# Patient Record
Sex: FEMALE | Race: WHITE | NOT HISPANIC OR LATINO | Employment: FULL TIME | URBAN - METROPOLITAN AREA
[De-identification: names, ages, dates, MRNs, and addresses within clinical notes are randomized per-mention and may not be internally consistent; named-entity substitution may affect disease eponyms.]

---

## 2024-01-24 ENCOUNTER — OFFICE VISIT (OUTPATIENT)
Dept: PODIATRY | Facility: CLINIC | Age: 34
End: 2024-01-24
Payer: COMMERCIAL

## 2024-01-24 ENCOUNTER — ANCILLARY PROCEDURE (OUTPATIENT)
Dept: GENERAL RADIOLOGY | Facility: CLINIC | Age: 34
End: 2024-01-24
Attending: PODIATRIST
Payer: COMMERCIAL

## 2024-01-24 VITALS
DIASTOLIC BLOOD PRESSURE: 87 MMHG | RESPIRATION RATE: 16 BRPM | HEART RATE: 83 BPM | SYSTOLIC BLOOD PRESSURE: 128 MMHG | OXYGEN SATURATION: 100 %

## 2024-01-24 DIAGNOSIS — L02.612 ABSCESS OF FIFTH TOE OF LEFT FOOT: ICD-10-CM

## 2024-01-24 DIAGNOSIS — L03.116 CELLULITIS OF FOOT, LEFT: ICD-10-CM

## 2024-01-24 DIAGNOSIS — L03.116 CELLULITIS OF FOOT, LEFT: Primary | ICD-10-CM

## 2024-01-24 PROCEDURE — 73630 X-RAY EXAM OF FOOT: CPT | Mod: LT | Performed by: RADIOLOGY

## 2024-01-24 PROCEDURE — 87077 CULTURE AEROBIC IDENTIFY: CPT | Performed by: PODIATRIST

## 2024-01-24 PROCEDURE — 87070 CULTURE OTHR SPECIMN AEROBIC: CPT | Performed by: PODIATRIST

## 2024-01-24 PROCEDURE — 99204 OFFICE O/P NEW MOD 45 MIN: CPT | Performed by: PODIATRIST

## 2024-01-24 PROCEDURE — 87205 SMEAR GRAM STAIN: CPT | Performed by: PODIATRIST

## 2024-01-24 PROCEDURE — 87186 SC STD MICRODIL/AGAR DIL: CPT | Performed by: PODIATRIST

## 2024-01-24 RX ORDER — ESCITALOPRAM OXALATE 10 MG/1
5 TABLET ORAL DAILY
COMMUNITY
Start: 2023-05-17

## 2024-01-24 RX ORDER — CEPHALEXIN 500 MG/1
500 CAPSULE ORAL 4 TIMES DAILY
Qty: 40 CAPSULE | Refills: 0 | Status: SHIPPED | OUTPATIENT
Start: 2024-01-24 | End: 2024-03-26

## 2024-01-24 ASSESSMENT — PAIN SCALES - GENERAL: PAINLEVEL: MILD PAIN (3)

## 2024-01-24 NOTE — PROGRESS NOTES
Subjective:    Pt is seen today in consult for pain in her left fifth toe.  This started Sunday night approximately 3 days ago.  Pain aggravated by activity and relieved by rest.  Is slowly getting worse.  It is the end and medial portion of the toe.  She denies any history of trauma.  She denies any significant cold exposure.  Denies drainage.  Denies purulence or odor.  Denies fever or chills.  She is never injured this before.  Denies breaking any objects at home.  She does have a cat.  Has numerous allergies to antibiotics.    ROS:  see above         Allergies   Allergen Reactions    Penicillins Hives and Rash     As a child   As a child   Welts, diarrhea    Welts, diarrhea    As a child    Amoxicillin-Pot Clavulanate     Sulfa Antibiotics Hives    Clarithromycin Rash and Unknown     Abstracted from transferred records - [facility])    Sulfamethoxazole-Trimethoprim Rash     Abstracted from transferred records - [facility])       Current Outpatient Medications   Medication Sig Dispense Refill    cephALEXin (KEFLEX) 500 MG capsule Take 1 capsule (500 mg) by mouth 4 times daily 40 capsule 0    escitalopram (LEXAPRO) 10 MG tablet Take 10 mg by mouth daily      cholecalciferol 50 MCG (2000 UT) CAPS Take by mouth.      Prenatal Vit-Fe Fumarate-FA (M-VIT PO)          There is no problem list on file for this patient.      History reviewed. No pertinent past medical history.    History reviewed. No pertinent surgical history.    History reviewed. No pertinent family history.    Social History     Tobacco Use    Smoking status: Never    Smokeless tobacco: Never   Substance Use Topics    Alcohol use: Yes         Exam:    Vitals: /87   Pulse 83   Resp 16   SpO2 100%   BMI: There is no height or weight on file to calculate BMI.  Height: Data Unavailable    Constitutional/ general:  Pt is in no apparent distress, appears well-nourished.  Cooperative with history and physical exam.     Psych:  The patient answered questions  appropriately.  Normal affect.  Seems to have reasonable expectations, in terms of treatment.     Eyes:  Visual scanning/ tracking without deficit.     Ears:  Response to auditory stimuli is normal.  negative hearing aid devices.  Auricles in proper alignment.     Lymphatic:  Popliteal lymph nodes not enlarged.     Lungs:  Non labored breathing, non labored speech. No cough.  No audible wheezing. Even, quiet breathing.       Vascular:  positive pedal pulses bilaterally for both the DP and PT arteries.  CFT < 3 sec.  negative ankle edema.  positive pedal hair growth.    Neuro:  Alert and oriented x 3. Coordinated gait.  Light touch sensation is intact     Derm: Normal texture and turgor.  No erythema, ecchymosis, or cyanosis.      Musculoskeletal:    Lower extremity muscle strength is normal.  Patient is ambulatory without an assistive device or brace.  No gross deformities.  Normal range of motion of all forefoot joints.  Left fifth toe has erythema edema pain on palpation distal third toe.  With trimming back left fifth nail we note small amount of purulence from distal leading edge of nail centrally.  The nail is intact.  There is possibly another small tract distal toe.  There is no crepitus with palpation.  There is no odor.    Radiographic Exam:  X-Ray Findings:  I personally reviewed the films.  Unremarkable    A:  Left fifth toe cellulitis with underlying small abscess    P: We trim back left fifth nail.  Small amount of purulence was noted.  We cultured this..  She has cellulitis.  Discussed chilblains possibility but with purulence more likely cellulitis.  Will start on Keflex.  We noted her numerous allergies.  Will stop if any side effects from Keflex.  Will keep toe protected.  Discussed postop shoe but she would rather continue wearing sandals.  X-rays taken today of left foot.  I personally reviewed the x-rays.  Will call with culture results in 2 days.  Discussed with patient my out of town for the  next 2 weeks following this.  If erythema and edema get worse she will contact me where she may need to go to ED.    Hermes Bailey DPM, FACFAS

## 2024-01-24 NOTE — LETTER
1/24/2024         RE: Susan Andrea  [address]  [facility] [place] [address]        Dear Colleague,    Thank you for referring your patient, Susan Andrea, to the [facility]. Please see a copy of my visit note below.    Subjective:    Pt is seen today in consult for pain in her left fifth toe.  This started Sunday night approximately 3 days ago.  Pain aggravated by activity and relieved by rest.  Is slowly getting worse.  It is the end and medial portion of the toe.  She denies any history of trauma.  She denies any significant cold exposure.  Denies drainage.  Denies purulence or odor.  Denies fever or chills.  She is never injured this before.  Denies breaking any objects at home.  She does have a cat.  Has numerous allergies to antibiotics.    ROS:  see above         Allergies   Allergen Reactions     Penicillins Hives and Rash     As a child   As a child   Welts, diarrhea    Welts, diarrhea    As a child     Amoxicillin-Pot Clavulanate      Sulfa Antibiotics Hives     Clarithromycin Rash and Unknown     Abstracted from transferred records - [facility])     Sulfamethoxazole-Trimethoprim Rash     Abstracted from transferred records - [facility])       Current Outpatient Medications   Medication Sig Dispense Refill     cephALEXin (KEFLEX) 500 MG capsule Take 1 capsule (500 mg) by mouth 4 times daily 40 capsule 0     escitalopram (LEXAPRO) 10 MG tablet Take 10 mg by mouth daily       cholecalciferol 50 MCG (2000 UT) CAPS Take by mouth.       Prenatal Vit-Fe Fumarate-FA (M-VIT PO)          There is no problem list on file for this patient.      History reviewed. No pertinent past medical history.    History reviewed. No pertinent surgical history.    History reviewed. No pertinent family history.    Social History     Tobacco Use     Smoking status: Never     Smokeless tobacco: Never   Substance Use Topics     Alcohol use: Yes         Exam:    Vitals: /87   Pulse 83   Resp 16    SpO2 100%   BMI: There is no height or weight on file to calculate BMI.  Height: Data Unavailable    Constitutional/ general:  Pt is in no apparent distress, appears well-nourished.  Cooperative with history and physical exam.     Psych:  The patient answered questions appropriately.  Normal affect.  Seems to have reasonable expectations, in terms of treatment.     Eyes:  Visual scanning/ tracking without deficit.     Ears:  Response to auditory stimuli is normal.  negative hearing aid devices.  Auricles in proper alignment.     Lymphatic:  Popliteal lymph nodes not enlarged.     Lungs:  Non labored breathing, non labored speech. No cough.  No audible wheezing. Even, quiet breathing.       Vascular:  positive pedal pulses bilaterally for both the DP and PT arteries.  CFT < 3 sec.  negative ankle edema.  positive pedal hair growth.    Neuro:  Alert and oriented x 3. Coordinated gait.  Light touch sensation is intact     Derm: Normal texture and turgor.  No erythema, ecchymosis, or cyanosis.      Musculoskeletal:    Lower extremity muscle strength is normal.  Patient is ambulatory without an assistive device or brace.  No gross deformities.  Normal range of motion of all forefoot joints.  Left fifth toe has erythema edema pain on palpation distal third toe.  With trimming back left fifth nail we note small amount of purulence from distal leading edge of nail centrally.  The nail is intact.  There is possibly another small tract distal toe.  There is no crepitus with palpation.  There is no odor.    Radiographic Exam:  X-Ray Findings:  I personally reviewed the films.  Unremarkable    A:  Left fifth toe cellulitis with underlying small abscess    P: We trim back left fifth nail.  Small amount of purulence was noted.  We cultured this..  She has cellulitis.  Discussed chilblains possibility but with purulence more likely cellulitis.  Will start on Keflex.  We noted her numerous allergies.  Will stop if any side effects  from Keflex.  Will keep toe protected.  Discussed postop shoe but she would rather continue wearing sandals.  X-rays taken today of left foot.  I personally reviewed the x-rays.  Will call with culture results in 2 days.  Discussed with patient my out of town for the next 2 weeks following this.  If erythema and edema get worse she will contact me where she may need to go to ED.    Hermes Bailey DPM, FACFAS              Again, thank you for allowing me to participate in the care of your patient.        Sincerely,        Hermes Bailey DPM

## 2024-01-29 LAB
BACTERIA SKIN AEROBE CULT: ABNORMAL
GRAM STAIN RESULT: ABNORMAL

## 2024-03-10 ENCOUNTER — HEALTH MAINTENANCE LETTER (OUTPATIENT)
Age: 34
End: 2024-03-10

## 2024-03-25 SDOH — HEALTH STABILITY: PHYSICAL HEALTH: ON AVERAGE, HOW MANY MINUTES DO YOU ENGAGE IN EXERCISE AT THIS LEVEL?: 30 MIN

## 2024-03-25 SDOH — HEALTH STABILITY: PHYSICAL HEALTH: ON AVERAGE, HOW MANY DAYS PER WEEK DO YOU ENGAGE IN MODERATE TO STRENUOUS EXERCISE (LIKE A BRISK WALK)?: 4 DAYS

## 2024-03-25 ASSESSMENT — SOCIAL DETERMINANTS OF HEALTH (SDOH): HOW OFTEN DO YOU GET TOGETHER WITH FRIENDS OR RELATIVES?: ONCE A WEEK

## 2024-03-26 ENCOUNTER — OFFICE VISIT (OUTPATIENT)
Dept: FAMILY MEDICINE | Facility: CLINIC | Age: 34
End: 2024-03-26
Payer: COMMERCIAL

## 2024-03-26 VITALS
OXYGEN SATURATION: 99 % | WEIGHT: 146 LBS | BODY MASS INDEX: 22.91 KG/M2 | HEIGHT: 67 IN | DIASTOLIC BLOOD PRESSURE: 80 MMHG | HEART RATE: 95 BPM | TEMPERATURE: 99.3 F | SYSTOLIC BLOOD PRESSURE: 115 MMHG | RESPIRATION RATE: 16 BRPM

## 2024-03-26 DIAGNOSIS — Z13.0 SCREENING, ANEMIA, DEFICIENCY, IRON: ICD-10-CM

## 2024-03-26 DIAGNOSIS — Z13.29 SCREENING FOR THYROID DISORDER: ICD-10-CM

## 2024-03-26 DIAGNOSIS — L70.0 ACNE VULGARIS: ICD-10-CM

## 2024-03-26 DIAGNOSIS — D17.30 LIPOMA OF SKIN AND SUBCUTANEOUS TISSUE: ICD-10-CM

## 2024-03-26 DIAGNOSIS — Z13.1 SCREENING FOR DIABETES MELLITUS: ICD-10-CM

## 2024-03-26 DIAGNOSIS — Z00.00 ROUTINE GENERAL MEDICAL EXAMINATION AT A HEALTH CARE FACILITY: Primary | ICD-10-CM

## 2024-03-26 PROBLEM — R73.03 PREDIABETES: Status: ACTIVE | Noted: 2023-02-16

## 2024-03-26 PROBLEM — F41.8 DEPRESSION WITH ANXIETY: Status: ACTIVE | Noted: 2023-02-13

## 2024-03-26 LAB
ALBUMIN SERPL BCG-MCNC: 4.6 G/DL (ref 3.5–5.2)
ALP SERPL-CCNC: 54 U/L (ref 40–150)
ALT SERPL W P-5'-P-CCNC: 14 U/L (ref 0–50)
ANION GAP SERPL CALCULATED.3IONS-SCNC: 12 MMOL/L (ref 7–15)
AST SERPL W P-5'-P-CCNC: 16 U/L (ref 0–45)
BASOPHILS # BLD AUTO: 0 10E3/UL (ref 0–0.2)
BASOPHILS NFR BLD AUTO: 0 %
BILIRUB SERPL-MCNC: 0.3 MG/DL
BUN SERPL-MCNC: 12.2 MG/DL (ref 6–20)
CALCIUM SERPL-MCNC: 9.7 MG/DL (ref 8.6–10)
CHLORIDE SERPL-SCNC: 102 MMOL/L (ref 98–107)
CREAT SERPL-MCNC: 0.71 MG/DL (ref 0.51–0.95)
DEPRECATED HCO3 PLAS-SCNC: 24 MMOL/L (ref 22–29)
EGFRCR SERPLBLD CKD-EPI 2021: >90 ML/MIN/1.73M2
EOSINOPHIL # BLD AUTO: 0.1 10E3/UL (ref 0–0.7)
EOSINOPHIL NFR BLD AUTO: 1 %
ERYTHROCYTE [DISTWIDTH] IN BLOOD BY AUTOMATED COUNT: 12.2 % (ref 10–15)
GLUCOSE SERPL-MCNC: 99 MG/DL (ref 70–99)
HBA1C MFR BLD: 5.5 % (ref 0–5.6)
HCT VFR BLD AUTO: 42.8 % (ref 35–47)
HGB BLD-MCNC: 14.4 G/DL (ref 11.7–15.7)
IMM GRANULOCYTES # BLD: 0 10E3/UL
IMM GRANULOCYTES NFR BLD: 0 %
LYMPHOCYTES # BLD AUTO: 1.4 10E3/UL (ref 0.8–5.3)
LYMPHOCYTES NFR BLD AUTO: 29 %
MCH RBC QN AUTO: 30.3 PG (ref 26.5–33)
MCHC RBC AUTO-ENTMCNC: 33.6 G/DL (ref 31.5–36.5)
MCV RBC AUTO: 90 FL (ref 78–100)
MONOCYTES # BLD AUTO: 0.3 10E3/UL (ref 0–1.3)
MONOCYTES NFR BLD AUTO: 7 %
NEUTROPHILS # BLD AUTO: 2.9 10E3/UL (ref 1.6–8.3)
NEUTROPHILS NFR BLD AUTO: 62 %
PLATELET # BLD AUTO: 325 10E3/UL (ref 150–450)
POTASSIUM SERPL-SCNC: 4.6 MMOL/L (ref 3.4–5.3)
PROT SERPL-MCNC: 7.4 G/DL (ref 6.4–8.3)
RBC # BLD AUTO: 4.75 10E6/UL (ref 3.8–5.2)
SODIUM SERPL-SCNC: 138 MMOL/L (ref 135–145)
TSH SERPL DL<=0.005 MIU/L-ACNC: 1.5 UIU/ML (ref 0.3–4.2)
WBC # BLD AUTO: 4.7 10E3/UL (ref 4–11)

## 2024-03-26 PROCEDURE — 80053 COMPREHEN METABOLIC PANEL: CPT | Performed by: PHYSICIAN ASSISTANT

## 2024-03-26 PROCEDURE — 85025 COMPLETE CBC W/AUTO DIFF WBC: CPT | Performed by: PHYSICIAN ASSISTANT

## 2024-03-26 PROCEDURE — 83036 HEMOGLOBIN GLYCOSYLATED A1C: CPT | Performed by: PHYSICIAN ASSISTANT

## 2024-03-26 PROCEDURE — 84443 ASSAY THYROID STIM HORMONE: CPT | Performed by: PHYSICIAN ASSISTANT

## 2024-03-26 PROCEDURE — 36415 COLL VENOUS BLD VENIPUNCTURE: CPT | Performed by: PHYSICIAN ASSISTANT

## 2024-03-26 PROCEDURE — 99385 PREV VISIT NEW AGE 18-39: CPT | Performed by: PHYSICIAN ASSISTANT

## 2024-03-26 RX ORDER — SPIRONOLACTONE 25 MG/1
TABLET ORAL
Qty: 74 TABLET | Refills: 1 | Status: SHIPPED | OUTPATIENT
Start: 2024-03-26 | End: 2024-07-01

## 2024-03-26 RX ORDER — TRETINOIN 0.5 MG/G
CREAM TOPICAL AT BEDTIME
COMMUNITY
Start: 2023-04-03

## 2024-03-26 RX ORDER — DEXTROAMPHETAMINE SACCHARATE, AMPHETAMINE ASPARTATE MONOHYDRATE, DEXTROAMPHETAMINE SULFATE AND AMPHETAMINE SULFATE 3.75; 3.75; 3.75; 3.75 MG/1; MG/1; MG/1; MG/1
15 CAPSULE, EXTENDED RELEASE ORAL DAILY
COMMUNITY
Start: 2024-03-04

## 2024-03-26 RX ORDER — BENZOYL PEROXIDE 10 G/100G
SUSPENSION TOPICAL WEEKLY
COMMUNITY

## 2024-03-26 RX ORDER — COPPER 313.4 MG/1
1 INTRAUTERINE DEVICE INTRAUTERINE ONCE
COMMUNITY

## 2024-03-26 RX ORDER — ASCORBIC ACID 125 MG
250 TABLET,CHEWABLE ORAL DAILY
COMMUNITY

## 2024-03-26 ASSESSMENT — PAIN SCALES - GENERAL: PAINLEVEL: NO PAIN (0)

## 2024-03-26 NOTE — PATIENT INSTRUCTIONS
Preventive Care Advice   This is general advice given by our system to help you stay healthy. However, your care team may have specific advice just for you. Please talk to your care team about your preventive care needs.  Nutrition  Eat 5 or more servings of fruits and vegetables each day.  Try wheat bread, brown rice and whole grain pasta (instead of white bread, rice, and pasta).  Get enough calcium and vitamin D. Check the label on foods and aim for 100% of the RDA (recommended daily allowance).  Lifestyle  Exercise at least 150 minutes each week   (30 minutes a day, 5 days a week).  Do muscle strengthening activities 2 days a week. These help control your weight and prevent disease.  No smoking.  Wear sunscreen to prevent skin cancer.  Have a dental exam and cleaning every 6 months.  Yearly exams  See your health care team every year to talk about:  Any changes in your health.  Any medicines your care team has prescribed.  Preventive care, family planning, and ways to prevent chronic diseases.  Shots (vaccines)   HPV shots (up to age 26), if you've never had them before.  Hepatitis B shots (up to age 59), if you've never had them before.  COVID-19 shot: Get this shot when it's due.  Flu shot: Get a flu shot every year.  Tetanus shot: Get a tetanus shot every 10 years.  Pneumococcal, hepatitis A, and RSV shots: Ask your care team if you need these based on your risk.  Shingles shot (for age 50 and up).  General health tests  Diabetes screening:  Starting at age 35, Get screened for diabetes at least every 3 years.  If you are younger than age 35, ask your care team if you should be screened for diabetes.  Cholesterol test: At age 39, start having a cholesterol test every 5 years, or more often if advised.  Bone density scan (DEXA): At age 50, ask your care team if you should have this scan for osteoporosis (brittle bones).  Hepatitis C: Get tested at least once in your life.  STIs (sexually transmitted  infections)  Before age 24: Ask your care team if you should be screened for STIs.  After age 24: Get screened for STIs if you're at risk. You are at risk for STIs (including HIV) if:  You are sexually active with more than one person.  You don't use condoms every time.  You or a partner was diagnosed with a sexually transmitted infection.  If you are at risk for HIV, ask about PrEP medicine to prevent HIV.  Get tested for HIV at least once in your life, whether you are at risk for HIV or not.  Cancer screening tests  Cervical cancer screening: If you have a cervix, begin getting regular cervical cancer screening tests at age 21. Most people who have regular screenings with normal results can stop after age 65. Talk about this with your provider.  Breast cancer scan (mammogram): If you've ever had breasts, begin having regular mammograms starting at age 40. This is a scan to check for breast cancer.  Colon cancer screening: It is important to start screening for colon cancer at age 45.  Have a colonoscopy test every 10 years (or more often if you're at risk) Or, ask your provider about stool tests like a FIT test every year or Cologuard test every 3 years.  To learn more about your testing options, visit: https://www.EchoSign/270193.pdf.  For help making a decision, visit: https://bit.ly/bz32887.  Prostate cancer screening test: If you have a prostate and are age 55 to 69, ask your provider if you would benefit from a yearly prostate cancer screening test.  Lung cancer screening: If you are a current or former smoker age 50 to 80, ask your care team if ongoing lung cancer screenings are right for you.  For informational purposes only. Not to replace the advice of your health care provider. Copyright   2023 [employer]. All rights reserved. Clinically reviewed by the [facility] Transitions Program. [employer] [id number] - REV 01/24.    Learning About Stress  What is stress?     Stress is your  body's response to a hard situation. Your body can have a physical, emotional, or mental response. Stress is a fact of life for most people, and it affects everyone differently. What causes stress for you may not be stressful for someone else.  A lot of things can cause stress. You may feel stress when you go on a job interview, take a test, or run a race. This kind of short-term stress is normal and even useful. It can help you if you need to work hard or react quickly. For example, stress can help you finish an important job on time.  Long-term stress is caused by ongoing stressful situations or events. Examples of long-term stress include long-term health problems, ongoing problems at work, or conflicts in your family. Long-term stress can harm your health.  How does stress affect your health?  When you are stressed, your body responds as though you are in danger. It makes hormones that speed up your heart, make you breathe faster, and give you a burst of energy. This is called the fight-or-flight stress response. If the stress is over quickly, your body goes back to normal and no harm is done.  But if stress happens too often or lasts too long, it can have bad effects. Long-term stress can make you more likely to get sick, and it can make symptoms of some diseases worse. If you tense up when you are stressed, you may develop neck, shoulder, or low back pain. Stress is linked to high blood pressure and heart disease.  Stress also harms your emotional health. It can make you carrero, tense, or depressed. Your relationships may suffer, and you may not do well at work or school.  What can you do to manage stress?  You can try these things to help manage stress:   Do something active. Exercise or activity can help reduce stress. Walking is a great way to get started. Even everyday activities such as housecleaning or yard work can help.  Try yoga or елена chi. These techniques combine exercise and meditation. You may need  some training at first to learn them.  Do something you enjoy. For example, listen to music or go to a movie. Practice your hobby or do volunteer work.  Meditate. This can help you relax, because you are not worrying about what happened before or what may happen in the future.  Do guided imagery. Imagine yourself in any setting that helps you feel calm. You can use online videos, books, or a teacher to guide you.  Do breathing exercises. For example:  From a standing position, bend forward from the waist with your knees slightly bent. Let your arms dangle close to the floor.  Breathe in slowly and deeply as you return to a standing position. Roll up slowly and lift your head last.  Hold your breath for just a few seconds in the standing position.  Breathe out slowly and bend forward from the waist.  Let your feelings out. Talk, laugh, cry, and express anger when you need to. Talking with supportive friends or family, a counselor, or a sin leader about your feelings is a healthy way to relieve stress. Avoid discussing your feelings with people who make you feel worse.  Write. It may help to write about things that are bothering you. This helps you find out how much stress you feel and what is causing it. When you know this, you can find better ways to cope.  What can you do to prevent stress?  You might try some of these things to help prevent stress:  Manage your time. This helps you find time to do the things you want and need to do.  Get enough sleep. Your body recovers from the stresses of the day while you are sleeping.  Get support. Your family, friends, and community can make a difference in how you experience stress.  Limit your news feed. Avoid or limit time on social media or news that may make you feel stressed.  Do something active. Exercise or activity can help reduce stress. Walking is a great way to get started.  Where can you learn more?  Go to https://www.healthwise.net/patiented  Enter N032 in the  "search box to learn more about \"Learning About Stress.\"  Current as of: October 24, 2023               Content Version: 14.0    7563-5640 [employer].   Care instructions adapted under license by your healthcare professional. If you have questions about a medical condition or this instruction, always ask your healthcare professional. [employer] disclaims any warranty or liability for your use of this information.      "

## 2024-03-26 NOTE — RESULT ENCOUNTER NOTE
"Rainer Davis  Your attached labs are overall within normal limits ans stable.  You are still in the prediabetic range, but nothing to do different at this time.  Continue a healthy diet and exercise.    Please contact the office with any questions or concerns.    Aneta Garland \"Jasiel\" ЮЛИЯ Gupta    "

## 2024-03-27 ENCOUNTER — MYC MEDICAL ADVICE (OUTPATIENT)
Dept: FAMILY MEDICINE | Facility: CLINIC | Age: 34
End: 2024-03-27
Payer: COMMERCIAL

## 2024-04-03 ENCOUNTER — MYC MEDICAL ADVICE (OUTPATIENT)
Dept: FAMILY MEDICINE | Facility: CLINIC | Age: 34
End: 2024-04-03
Payer: COMMERCIAL

## 2024-05-27 ENCOUNTER — MYC MEDICAL ADVICE (OUTPATIENT)
Dept: FAMILY MEDICINE | Facility: CLINIC | Age: 34
End: 2024-05-27
Payer: COMMERCIAL

## 2024-05-27 DIAGNOSIS — L70.0 ACNE VULGARIS: Primary | ICD-10-CM

## 2024-06-03 RX ORDER — SPIRONOLACTONE 100 MG/1
100 TABLET, FILM COATED ORAL DAILY
Qty: 30 TABLET | Refills: 1 | Status: SHIPPED | OUTPATIENT
Start: 2024-06-03 | End: 2024-07-01

## 2024-06-03 NOTE — TELEPHONE ENCOUNTER
DE (DOD)      Please see [employer] message.  Please address due to MP's absence.      Thank you,  Phuong Santillan RN

## 2024-07-01 ENCOUNTER — VIRTUAL VISIT (OUTPATIENT)
Dept: FAMILY MEDICINE | Facility: CLINIC | Age: 34
End: 2024-07-01
Payer: COMMERCIAL

## 2024-07-01 DIAGNOSIS — L70.0 ACNE VULGARIS: Primary | ICD-10-CM

## 2024-07-01 PROCEDURE — 99213 OFFICE O/P EST LOW 20 MIN: CPT | Mod: 95 | Performed by: PHYSICIAN ASSISTANT

## 2024-07-01 RX ORDER — SPIRONOLACTONE 100 MG/1
100 TABLET, FILM COATED ORAL DAILY
Qty: 90 TABLET | Refills: 1 | Status: SHIPPED | OUTPATIENT
Start: 2024-07-01

## 2024-07-01 RX ORDER — TRETINOIN 1 MG/G
CREAM TOPICAL AT BEDTIME
Qty: 45 G | Refills: 1 | Status: SHIPPED | OUTPATIENT
Start: 2024-07-01 | End: 2024-07-08

## 2024-07-01 NOTE — PROGRESS NOTES
Susan is a 34 year old who is being evaluated via a billable video visit.    How would you like to obtain your AVS? MyChart  If the video visit is dropped, the invitation should be resent by: Text to cell phone: [phone]  Will anyone else be joining your video visit? No      Assessment & Plan     Acne vulgaris  Long standing, chronic, controlled- regular refills sent to pharmacy with potential to increase further to 150 mg spironolactone over the next few months. Return to clinic with any worsening or changes in symptoms and follow up for routine care.   - Comprehensive metabolic panel (BMP + Alb, Alk Phos, ALT, AST, Total. Bili, TP); Future  - spironolactone (ALDACTONE) 100 MG tablet; Take 1 tablet (100 mg) by mouth daily  - tretinoin (RETIN-A) 0.1 % external cream; Apply topically at bedtime        See Patient Instructions    Subjective   Susan is a 34 year old, presenting for the following health issues:  No chief complaint on file.    History of Present Illness       Reason for visit:  Acne and medication management    She eats 2-3 servings of fruits and vegetables daily.She consumes 0 sweetened beverage(s) daily.She exercises with enough effort to increase her heart rate 20 to 29 minutes per day.  She exercises with enough effort to increase her heart rate 3 or less days per week.   She is taking medications regularly.     Patient taking spironolactone 100 mg with improvement in body acne but not really face acne yet.  Had been on 100 mg for 3 months or so, but sometimes feels like she gets dehydrated quicker      Review of Systems  Constitutional, HEENT, cardiovascular, pulmonary, gi and gu systems are negative, except as otherwise noted.      Objective           Vitals:  No vitals were obtained today due to virtual visit.    Physical Exam   GENERAL: alert and no distress  EYES: Eyes grossly normal to inspection.  No discharge or erythema, or obvious scleral/conjunctival abnormalities.  RESP: No audible  wheeze, cough, or visible cyanosis.    SKIN: Visible skin clear. No significant rash, abnormal pigmentation or lesions.  NEURO: Cranial nerves grossly intact.  Mentation and speech appropriate for age.  PSYCH: Appropriate affect, tone, and pace of words          Video-Visit Details    Type of service:  Video Visit   Originating Location (pt. Location): Home    Distant Location (provider location):  Off-site  Platform used for Video Visit: Malaika  Signed Electronically by: Jasiel Gupta PA-C

## 2024-07-05 ENCOUNTER — LAB (OUTPATIENT)
Dept: LAB | Facility: CLINIC | Age: 34
End: 2024-07-05
Payer: COMMERCIAL

## 2024-07-05 DIAGNOSIS — L70.0 ACNE VULGARIS: ICD-10-CM

## 2024-07-05 LAB
ALBUMIN SERPL BCG-MCNC: 4.5 G/DL (ref 3.5–5.2)
ALP SERPL-CCNC: 60 U/L (ref 40–150)
ALT SERPL W P-5'-P-CCNC: 13 U/L (ref 0–50)
ANION GAP SERPL CALCULATED.3IONS-SCNC: 9 MMOL/L (ref 7–15)
AST SERPL W P-5'-P-CCNC: 19 U/L (ref 0–45)
BILIRUB SERPL-MCNC: 0.2 MG/DL
BUN SERPL-MCNC: 11.1 MG/DL (ref 6–20)
CALCIUM SERPL-MCNC: 9 MG/DL (ref 8.6–10)
CHLORIDE SERPL-SCNC: 102 MMOL/L (ref 98–107)
CREAT SERPL-MCNC: 0.72 MG/DL (ref 0.51–0.95)
DEPRECATED HCO3 PLAS-SCNC: 26 MMOL/L (ref 22–29)
EGFRCR SERPLBLD CKD-EPI 2021: >90 ML/MIN/1.73M2
GLUCOSE SERPL-MCNC: 99 MG/DL (ref 70–99)
POTASSIUM SERPL-SCNC: 4.5 MMOL/L (ref 3.4–5.3)
PROT SERPL-MCNC: 7.2 G/DL (ref 6.4–8.3)
SODIUM SERPL-SCNC: 137 MMOL/L (ref 135–145)

## 2024-07-05 PROCEDURE — 36415 COLL VENOUS BLD VENIPUNCTURE: CPT

## 2024-07-05 PROCEDURE — 80053 COMPREHEN METABOLIC PANEL: CPT

## 2024-07-08 DIAGNOSIS — L70.0 ACNE VULGARIS: ICD-10-CM

## 2024-07-08 RX ORDER — TRETINOIN 1 MG/G
CREAM TOPICAL AT BEDTIME
Qty: 45 G | Refills: 1 | Status: SHIPPED | OUTPATIENT
Start: 2024-07-08

## 2024-07-14 ENCOUNTER — MYC MEDICAL ADVICE (OUTPATIENT)
Dept: FAMILY MEDICINE | Facility: CLINIC | Age: 34
End: 2024-07-14
Payer: COMMERCIAL

## 2024-07-14 DIAGNOSIS — L70.0 ACNE VULGARIS: Primary | ICD-10-CM

## 2024-07-15 NOTE — TELEPHONE ENCOUNTER
"Should do it through dermatology - referral signed, but can look around for local alternative dermatologists    Thanks  Aneta \"Jasiel\" ЮЛИЯ Gupta   "

## 2024-07-15 NOTE — TELEPHONE ENCOUNTER
MP,  Please see below MyChart message and advise.  Pended dermatology referral  Thanks,  Kat CORONA RN

## 2024-08-01 ENCOUNTER — OFFICE VISIT (OUTPATIENT)
Dept: URGENT CARE | Facility: URGENT CARE | Age: 34
End: 2024-08-01
Payer: COMMERCIAL

## 2024-08-01 VITALS
DIASTOLIC BLOOD PRESSURE: 82 MMHG | OXYGEN SATURATION: 100 % | TEMPERATURE: 98.7 F | RESPIRATION RATE: 16 BRPM | HEART RATE: 77 BPM | SYSTOLIC BLOOD PRESSURE: 119 MMHG | BODY MASS INDEX: 22.8 KG/M2 | WEIGHT: 144 LBS

## 2024-08-01 DIAGNOSIS — H61.21 IMPACTED CERUMEN OF RIGHT EAR: ICD-10-CM

## 2024-08-01 DIAGNOSIS — H66.001 ACUTE SUPPURATIVE OTITIS MEDIA OF RIGHT EAR WITHOUT SPONTANEOUS RUPTURE OF TYMPANIC MEMBRANE, RECURRENCE NOT SPECIFIED: Primary | ICD-10-CM

## 2024-08-01 PROCEDURE — 99213 OFFICE O/P EST LOW 20 MIN: CPT | Performed by: PHYSICIAN ASSISTANT

## 2024-08-01 RX ORDER — TOBRAMYCIN AND DEXAMETHASONE 3; 1 MG/ML; MG/ML
4 SUSPENSION/ DROPS OPHTHALMIC 4 TIMES DAILY
Qty: 10 ML | Refills: 0 | Status: SHIPPED | OUTPATIENT
Start: 2024-08-01 | End: 2024-08-11

## 2024-08-01 NOTE — PROGRESS NOTES
Assessment & Plan     (H66.001) Acute suppurative otitis media of right ear without spontaneous rupture of tympanic membrane, recurrence not specified  (primary encounter diagnosis)  Comment:   Plan: tobramycin-dexAMETHasone (TOBRADEX) 0.3-0.1 %         ophthalmic suspension      Unable to visualize the patient's right TM.  However given her symptoms of being able to draw fluid out of the right middle ear, I am concerned about the strong possibility of acute otitis media today.  Starting the patient on TobraDex drops for 10 days, which has been successful in the past.    Symptomatic cares include tylenol/ibuprofen and warm compresses 4 times per day. Follow up with PCP if no improvement in 3 days.    Seek urgent medical evaluation if there are new or worsening symptoms such as fever up to 104 degrees F or greater, chest tightness, wheezing, facial pressure, headaches, pain/redness/swelling behind the ears or around the eyes, trouble breathing, or trouble swallowing.     Pt/guardian verbalized understanding and agrees with the treatment plan.           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          No follow-ups on file.    Khanh Pitts PA-C  [facility] Davis is a 34 year old female who presents to clinic today for the following health issues:  Chief Complaint   Patient presents with    Urgent Care    Otitis Media     Hole in eardrum since a baby - Pt reports she can hear fluid in right ear since yesterday, started being painful 1 hr ago       HPI    The patient is a 34-year-old female who presents for evaluation of right ear pain which began yesterday.  Started becoming significantly painful about 1 hour ago.  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  States that when she places a finger in the ear and pulls outward, she can feel fluid coming out.  This is typically what happens when having an ear infection.  Uses a hearing aid in both ears due to hearing loss.  She reports having cerumen impaction in the right ear.  Has had this in the past as well, as needed or ears cleaned at the ENT clinic via suction due to having a perforated TM.  She has typically been treated with TobraDex when developing acute otitis media of the right ear.        Review of Systems  Constitutional, HEENT, cardiovascular, pulmonary, gi and gu systems are negative, except as otherwise noted.      Objective    /82 (BP Location: Right arm, Patient Position: Sitting, Cuff Size: Adult Regular)   Pulse 77   Temp 98.7  F (37.1  C) (Tympanic)   Resp 16   Wt 65.3 kg (144 lb)   SpO2 100%   Breastfeeding No   BMI 22.80 kg/m    Physical Exam  Vitals reviewed.   Constitutional:       General: She is not in acute distress.     Appearance: Normal appearance. She is not ill-appearing, toxic-appearing or diaphoretic.   HENT:      Head: Normocephalic and atraumatic.      Right Ear: Tympanic membrane, ear canal and external ear normal. No drainage, swelling or tenderness. There is impacted cerumen. No mastoid tenderness.      Left Ear: Tympanic membrane, ear canal and external ear normal. No drainage, swelling or tenderness.  No middle ear effusion. There is no impacted cerumen. No mastoid tenderness. Tympanic membrane is not injected, perforated, erythematous, retracted or bulging.      Ears:      Comments: Unable to visualize right TM due to cerumen impaction.     Nose: Nose normal. No congestion or rhinorrhea.      Mouth/Throat:      Mouth: Mucous membranes are moist.      Pharynx: No oropharyngeal exudate or posterior oropharyngeal erythema.   Cardiovascular:      Rate and Rhythm: Normal rate and regular rhythm.      Pulses: Normal pulses.      Heart sounds: Normal  heart sounds. No murmur heard.  Pulmonary:      Effort: Pulmonary effort is normal. No respiratory distress.      Breath sounds: Normal breath sounds. No stridor. No wheezing or rhonchi.   Musculoskeletal:      Cervical back: Neck supple. No rigidity. No muscular tenderness.   Lymphadenopathy:      Cervical: No cervical adenopathy.      Right cervical: No superficial, deep or posterior cervical adenopathy.     Left cervical: No superficial, deep or posterior cervical adenopathy.   Neurological:      Mental Status: She is alert and oriented to person, place, and time.      Sensory: No sensory deficit.

## 2024-08-05 ENCOUNTER — MYC MEDICAL ADVICE (OUTPATIENT)
Dept: OTOLARYNGOLOGY | Facility: CLINIC | Age: 34
End: 2024-08-05
Payer: COMMERCIAL

## 2024-08-06 NOTE — TELEPHONE ENCOUNTER
Please offer patient the 1:10pm appointment on hold on 8/12/24. Per provider    Appt note- Just an ear cleaning. Will schedule an audiogram a different day if needed.

## 2024-08-15 ENCOUNTER — MYC MEDICAL ADVICE (OUTPATIENT)
Dept: FAMILY MEDICINE | Facility: CLINIC | Age: 34
End: 2024-08-15
Payer: COMMERCIAL

## 2024-08-15 NOTE — TELEPHONE ENCOUNTER
Printed form placed with Jasiel Gupta for Signature  Then can Fax to Number on Form.  [facility] Coordinator

## 2024-08-16 NOTE — TELEPHONE ENCOUNTER
Form completed and signed Faxed and sent for scanning  [facility] Coordinator

## 2024-11-19 ENCOUNTER — NURSE TRIAGE (OUTPATIENT)
Dept: FAMILY MEDICINE | Facility: CLINIC | Age: 34
End: 2024-11-19

## 2024-11-19 ENCOUNTER — TELEPHONE (OUTPATIENT)
Dept: FAMILY MEDICINE | Facility: CLINIC | Age: 34
End: 2024-11-19

## 2024-11-19 DIAGNOSIS — U07.1 INFECTION DUE TO 2019 NOVEL CORONAVIRUS: Primary | ICD-10-CM

## 2024-11-19 NOTE — TELEPHONE ENCOUNTER
Pt notes symptoms include achy, headache, fever, sinus congestion  Highest fever 99.5  Symptoms began Sunday afternoon  Tested positive yesterday 11/18/22  Pt denies significant medical history or complication  Pt had first round of COVID vaccine and first booster  Not currently pregnant  Cannot take O2 sats at home    Advised pt to complete Evisit - pt interested in paxlovid prescription.   Pt expressed agreement and understanding. All questions were answered.   Advised pt to call back if symptoms worsen or new symptoms arise.   Advised red flag symptoms in which they'd be advised to present to ED or call 911.     Meka RIBERA RN      Reason for Disposition   COVID-19 diagnosed by positive lab test (e.g., PCR, rapid self-test kit) and mild symptoms (e.g., cough, fever, others) and no complications or SOB    Additional Information   Negative: SEVERE difficulty breathing (e.g., struggling for each breath, speaks in single words)   Negative: Difficult to awaken or acting confused (e.g., disoriented, slurred speech)   Negative: SEVERE or constant chest pain or pressure  (Exception: Mild central chest pain, present only when coughing.)   Negative: MODERATE difficulty breathing (e.g., speaks in phrases, SOB even at rest, pulse 100-120)   Negative: Headache and stiff neck (can't touch chin to chest)   Negative: Oxygen level (e.g., pulse oximetry) 90% or lower   Negative: Shock suspected (e.g., cold/pale/clammy skin, too weak to stand, low BP, rapid pulse)   Negative: Sounds like a life-threatening emergency to the triager   Negative: Bluish (or gray) lips or face now   Negative: Diagnosed or suspected COVID-19 and symptoms lasting 3 or more weeks   Negative: COVID-19 exposure and no symptoms   Negative: COVID-19 vaccine reaction suspected (e.g., fever, headache, muscle aches) occurring 1 to 3 days after getting vaccine   Negative: COVID-19 vaccine, questions about   Negative: Lives with someone known to have influenza (flu  test positive) and flu-like symptoms (e.g., cough, runny nose, sore throat, SOB; with or without fever)   Negative: Possible COVID-19 symptoms and triager concerned about severity of symptoms or other causes   Negative: COVID-19 and breastfeeding, questions about   Negative: Chest pain or pressure  (Exception: MILD central chest pain, present only when coughing.)   Negative: Patient sounds very sick or weak to the triager   Negative: Drinking very little and dehydration suspected (e.g., no urine > 12 hours, very dry mouth, very lightheaded)   Negative: MILD difficulty breathing (e.g., minimal/no SOB at rest, SOB with walking, pulse <100)   Negative: Fever > 103 F (39.4 C)   Negative: Fever > 101 F (38.3 C) and over 60 years of age   Negative: Fever > 100.0 F (37.8 C) and bedridden (e.g., CVA, chronic illness, recovering from surgery)   Negative: HIGH RISK patient (e.g., weak immune system, age > 64 years, obesity with BMI of 30 or higher, pregnant, chronic lung disease or other chronic medical condition) and COVID symptoms (e.g., cough, fever)  (Exceptions: Already seen by doctor or NP/PA and no new or worsening symptoms.)   Negative: HIGH RISK patient and influenza is widespread in the community and ONE OR MORE respiratory symptoms: cough, sore throat, runny or stuffy nose   Negative: HIGH RISK patient and influenza exposure within the last 7 days and ONE OR MORE respiratory symptoms: cough, sore throat, runny or stuffy nose   Negative: Oxygen level (e.g., pulse oximetry) 91 to 94%   Negative: COVID-19 infection suspected by caller or triager and mild symptoms (cough, fever, or others) and negative COVID-19 rapid test   Negative: Fever present > 3 days (72 hours)   Negative: Fever returns after gone for over 24 hours and symptoms worse or not improved   Negative: Continuous (nonstop) coughing interferes with work or school and no improvement using cough treatment per Care Advice   Negative: Cough present > 3  weeks   Negative: COVID-19 diagnosed by positive lab test (e.g., PCR, rapid self-test kit) and NO symptoms (e.g., cough, fever, others)    Protocols used: Coronavirus (COVID-19) Diagnosed or [id number]

## 2024-11-19 NOTE — TELEPHONE ENCOUNTER
calling to see it pt would qualify for Paxlovid. Consent to communicate is on file. Pt is currently unavailable to talk to in order to run the Covid protocol. She will call back when she is available.    Kristine Merchant RN  [facility]

## 2024-11-21 NOTE — TELEPHONE ENCOUNTER
RN COVID TREATMENT VISIT  11/21/24      The patient has been triaged and does not require a higher level of care.    Susan Andrea  34 year old  Current weight? 145 lbs    Has the patient been seen by a primary care provider at an [facility] or [facility] within the past two years? Yes.   Have you been in close proximity to/do you have a known exposure to a person with a confirmed case of influenza? No.     General treatment eligibility:  Date of positive COVID test (PCR or at home)?  11/18/24    Are you or have you been hospitalized for this COVID-19 infection? No.   Have you received monoclonal antibodies or antiviral treatment for COVID-19 since this positive test? No.   Do you have any of the following conditions that place you at risk of being very sick from COVID-19?   - Mental health disorders including mood disorders, depression, schizophrenia spectrum disorders   Yes, patient has at least one high risk condition as noted above.     Current COVID symptoms:   - cough  - fatigue  - muscle or body aches  - congestion or runny nose  Yes. Patient has at least one symptom as selected.     How many days since symptoms started? 5 days or less. Established patient, 12 years or older weighing at least 88.2 lbs, who has symptoms that started in the past 5 days, has not been hospitalized nor received treatment already, and is at risk for being very sick from COVID-19.     Treatment eligibility by RN:  Are you currently pregnant or nursing? No  Do you have a clinically significant hypersensitivity to nirmatrelvir or ritonavir, or toxic epidermal necrolysis (TEN) or Downey-Clarence Syndrome? No  Do you have a history of hepatitis, any hepatic impairment on the Problem List (such as Child-[place] Class C, cirrhosis, fatty liver disease, alcoholic liver disease), or was the last liver lab (hepatic panel, ALT, AST, ALK Phos, bilirubin) elevated in the past 6 months? No  Do you have any history of  severe renal impairment (eGFR < 30mL/min)? No    Is patient eligible to continue? Yes, patient meets all eligibility requirements for the RN COVID treatment (as denoted by all no responses above).     Current Outpatient Medications   Medication Sig Dispense Refill    amphetamine-dextroamphetamine (ADDERALL XR) 15 MG 24 hr capsule Take 15 mg by mouth daily      benzoyl peroxide (BENZOYL PEROXIDE WASH) 10 % external liquid Apply topically once a week      cholecalciferol 50 MCG (2000 UT) CAPS Take by mouth.      escitalopram (LEXAPRO) 10 MG tablet Take 5 mg by mouth daily      Magnesium Citrate 125 MG CAPS Take 250 mg by mouth daily      Misc Natural Products (PUMPKIN SEED OIL) CAPS Take 2,000 mg by mouth daily      Multiple Vitamins-Iron (MULTIVITAMIN/IRON PO) Take 1 capsule by mouth daily      paragard intrauterine copper device 1 each by Intrauterine route once      spironolactone (ALDACTONE) 100 MG tablet Take 1 tablet (100 mg) by mouth daily 90 tablet 1    tretinoin (RETIN-A) 0.05 % external cream Apply topically at bedtime      tretinoin (RETIN-A) 0.1 % external cream Apply topically at bedtime 45 g 1       Medications from List 1 of the standing order (on medications that exclude the use of Paxlovid) that patient is taking: NONE. Is patient taking [place]'s Wort? No  Is patient taking [place]'s Wort or any meds from List 1? No.   Medications from List 2 of the standing order (on meds that provider needs to adjust) that patient is taking: NONE. Is patient on any of the meds from List 2? No.   Medications from List 3 of standing order (on meds that a RN needs to adjust) that patient is taking: NONE. Is patient on any meds from List 3? No.     Paxlovid has an approximate 90% reduction in hospitalization. Paxlovid can possibly cause altered sense of taste, diarrhea (loose, watery stools), high blood pressure, muscle aches.     Would patient like a Paxlovid prescription?   Yes.   Lab Results   Component Value Date     GFRESTIMATED >90 07/05/2024       Was last eGFR reduced? No, eGFR 60 or greater/ No Result on record. Patient can receive the normal renal function dose. Paxlovid Rx sent to [facility]     Temporary change to home medications: None    All medication adjustments (holds, etc) were discussed with the patient and patient was asked to repeat back (teachback) their med adjustment.  Did patient understand med adjustment? No medication adjustments needed.         Reviewed the following instructions with the patient:    Paxlovid (nimatrelvir and ritonavir)    How it works  Two medicines (nirmatrelvir and ritonavir) are taken together. They stop the virus from growing. Less amount of virus is easier for your body to fight.    How to take  Medicine comes in a daily container with both medicine tablets. Take by mouth twice daily (once in the morning, once at night) for 5 days.  The number of tablets to take varies by patient.  Don't chew or break capsules. Swallow whole.    When to take  Take as soon as possible after positive COVID-19 test result, and within 5 days of your first symptoms.    Possible side effects  Can cause altered sense of taste, diarrhea (loose, watery stools), high blood pressure, muscle aches.    CIRILO ZAMORANO RN

## 2025-04-27 ENCOUNTER — HEALTH MAINTENANCE LETTER (OUTPATIENT)
Age: 35
End: 2025-04-27

## 2025-06-18 DIAGNOSIS — L70.0 ACNE VULGARIS: ICD-10-CM

## 2025-06-18 RX ORDER — TRETINOIN 1 MG/G
CREAM TOPICAL AT BEDTIME
Qty: 45 G | Refills: 0 | Status: SHIPPED | OUTPATIENT
Start: 2025-06-18

## 2025-06-18 NOTE — TELEPHONE ENCOUNTER
Jasiel,    Please see below message and advise on refill.   Other tretinoin cream (0.05%) is patient-reported.     Thanks,   Meka RIBERA RN

## 2025-06-18 NOTE — TELEPHONE ENCOUNTER
Clinic RN: Please investigate patient's chart or contact patient if the information cannot be found because there are 2 different strengths of medication on the current medication list.

## 2025-06-26 ENCOUNTER — OFFICE VISIT (OUTPATIENT)
Dept: FAMILY MEDICINE | Facility: CLINIC | Age: 35
End: 2025-06-26
Attending: PHYSICIAN ASSISTANT
Payer: COMMERCIAL

## 2025-06-26 VITALS
BODY MASS INDEX: 23.67 KG/M2 | DIASTOLIC BLOOD PRESSURE: 74 MMHG | OXYGEN SATURATION: 97 % | HEIGHT: 67 IN | RESPIRATION RATE: 16 BRPM | SYSTOLIC BLOOD PRESSURE: 107 MMHG | HEART RATE: 92 BPM | WEIGHT: 150.8 LBS | TEMPERATURE: 97.3 F

## 2025-06-26 DIAGNOSIS — Z00.00 ROUTINE GENERAL MEDICAL EXAMINATION AT A HEALTH CARE FACILITY: Primary | ICD-10-CM

## 2025-06-26 DIAGNOSIS — Z30.432 ENCOUNTER FOR IUD REMOVAL: ICD-10-CM

## 2025-06-26 DIAGNOSIS — Z13.1 SCREENING FOR DIABETES MELLITUS: ICD-10-CM

## 2025-06-26 DIAGNOSIS — Z13.29 SCREENING FOR THYROID DISORDER: ICD-10-CM

## 2025-06-26 DIAGNOSIS — F90.2 ATTENTION DEFICIT HYPERACTIVITY DISORDER (ADHD), COMBINED TYPE: ICD-10-CM

## 2025-06-26 DIAGNOSIS — F41.8 DEPRESSION WITH ANXIETY: ICD-10-CM

## 2025-06-26 DIAGNOSIS — Z13.0 SCREENING, ANEMIA, DEFICIENCY, IRON: ICD-10-CM

## 2025-06-26 LAB
ALBUMIN SERPL BCG-MCNC: 4.5 G/DL (ref 3.5–5.2)
ALP SERPL-CCNC: 49 U/L (ref 40–150)
ALT SERPL W P-5'-P-CCNC: 19 U/L (ref 0–50)
ANION GAP SERPL CALCULATED.3IONS-SCNC: 13 MMOL/L (ref 7–15)
AST SERPL W P-5'-P-CCNC: 23 U/L (ref 0–45)
BILIRUB SERPL-MCNC: 0.3 MG/DL
BUN SERPL-MCNC: 13.7 MG/DL (ref 6–20)
CALCIUM SERPL-MCNC: 9.7 MG/DL (ref 8.8–10.4)
CHLORIDE SERPL-SCNC: 103 MMOL/L (ref 98–107)
CREAT SERPL-MCNC: 0.72 MG/DL (ref 0.51–0.95)
EGFRCR SERPLBLD CKD-EPI 2021: >90 ML/MIN/1.73M2
EST. AVERAGE GLUCOSE BLD GHB EST-MCNC: 117 MG/DL
FERRITIN SERPL-MCNC: 17 NG/ML (ref 6–175)
GLUCOSE SERPL-MCNC: 57 MG/DL (ref 70–99)
HBA1C MFR BLD: 5.7 % (ref 0–5.6)
HCO3 SERPL-SCNC: 22 MMOL/L (ref 22–29)
HGB BLD-MCNC: 13.9 G/DL (ref 11.7–15.7)
MCV RBC AUTO: 86 FL (ref 78–100)
POTASSIUM SERPL-SCNC: 4.4 MMOL/L (ref 3.4–5.3)
PROT SERPL-MCNC: 7.5 G/DL (ref 6.4–8.3)
SODIUM SERPL-SCNC: 138 MMOL/L (ref 135–145)
TSH SERPL DL<=0.005 MIU/L-ACNC: 1.69 UIU/ML (ref 0.3–4.2)

## 2025-06-26 RX ORDER — OMEGA-3 FATTY ACIDS/FISH OIL 300-1000MG
CAPSULE ORAL
COMMUNITY

## 2025-06-26 SDOH — HEALTH STABILITY: PHYSICAL HEALTH: ON AVERAGE, HOW MANY DAYS PER WEEK DO YOU ENGAGE IN MODERATE TO STRENUOUS EXERCISE (LIKE A BRISK WALK)?: 3 DAYS

## 2025-06-26 ASSESSMENT — PATIENT HEALTH QUESTIONNAIRE - PHQ9
SUM OF ALL RESPONSES TO PHQ QUESTIONS 1-9: 10
10. IF YOU CHECKED OFF ANY PROBLEMS, HOW DIFFICULT HAVE THESE PROBLEMS MADE IT FOR YOU TO DO YOUR WORK, TAKE CARE OF THINGS AT HOME, OR GET ALONG WITH OTHER PEOPLE: SOMEWHAT DIFFICULT
SUM OF ALL RESPONSES TO PHQ QUESTIONS 1-9: 10

## 2025-06-26 ASSESSMENT — SOCIAL DETERMINANTS OF HEALTH (SDOH): HOW OFTEN DO YOU GET TOGETHER WITH FRIENDS OR RELATIVES?: TWICE A WEEK

## 2025-06-26 ASSESSMENT — PAIN SCALES - GENERAL: PAINLEVEL_OUTOF10: NO PAIN (0)

## 2025-06-26 NOTE — PATIENT INSTRUCTIONS
Magnesium glycinate*/taurate/lactate - stress/anxiety (PMS/mood swings)  Magnesium oxcide - neurtral  Magnesium sulfate (epsom salt soaks) - detox and relaxing  Magnesium malate - fibromyalgia and muscle cramps  Magnesium L threonate* - brain/cognition and mood (sleep,anxiety) - crosses BBB  Magnesium citrate - laxative effects          Patient Education   Preventive Care Advice   This is general advice given by our system to help you stay healthy. However, your care team may have specific advice just for you. Please talk to your care team about your preventive care needs.  Nutrition  Eat 5 or more servings of fruits and vegetables each day.  Try wheat bread, brown rice and whole grain pasta (instead of white bread, rice, and pasta).  Get enough calcium and vitamin D. Check the label on foods and aim for 100% of the RDA (recommended daily allowance).  Lifestyle  Exercise at least 150 minutes each week  (30 minutes a day, 5 days a week).  Do muscle strengthening activities 2 days a week. These help control your weight and prevent disease.  No smoking.  Wear sunscreen to prevent skin cancer.  Have a dental exam and cleaning every 6 months.  Yearly exams  See your health care team every year to talk about:  Any changes in your health.  Any medicines your care team has prescribed.  Preventive care, family planning, and ways to prevent chronic diseases.  Shots (vaccines)   HPV shots (up to age 26), if you've never had them before.  Hepatitis B shots (up to age 59), if you've never had them before.  COVID-19 shot: Get this shot when it's due.  Flu shot: Get a flu shot every year.  Tetanus shot: Get a tetanus shot every 10 years.  Pneumococcal, hepatitis A, and RSV shots: Ask your care team if you need these based on your risk.  Shingles shot (for age 50 and up)  General health tests  Diabetes screening:  Starting at age 35, Get screened for diabetes at least every 3 years.  If you are younger than age 35, ask your care  team if you should be screened for diabetes.  Cholesterol test: At age 39, start having a cholesterol test every 5 years, or more often if advised.  Bone density scan (DEXA): At age 50, ask your care team if you should have this scan for osteoporosis (brittle bones).  Hepatitis C: Get tested at least once in your life.  STIs (sexually transmitted infections)  Before age 24: Ask your care team if you should be screened for STIs.  After age 24: Get screened for STIs if you're at risk. You are at risk for STIs (including HIV) if:  You are sexually active with more than one person.  You don't use condoms every time.  You or a partner was diagnosed with a sexually transmitted infection.  If you are at risk for HIV, ask about PrEP medicine to prevent HIV.  Get tested for HIV at least once in your life, whether you are at risk for HIV or not.  Cancer screening tests  Cervical cancer screening: If you have a cervix, begin getting regular cervical cancer screening tests starting at age 21.  Breast cancer scan (mammogram): If you've ever had breasts, begin having regular mammograms starting at age 40. This is a scan to check for breast cancer.  Colon cancer screening: It is important to start screening for colon cancer at age 45.  Have a colonoscopy test every 10 years (or more often if you're at risk) Or, ask your provider about stool tests like a FIT test every year or Cologuard test every 3 years.  To learn more about your testing options, visit:   .  For help making a decision, visit:   https://bit.ly/ij56149.  Prostate cancer screening test: If you have a prostate, ask your care team if a prostate cancer screening test (PSA) at age 55 is right for you.  Lung cancer screening: If you are a current or former smoker ages 50 to 80, ask your care team if ongoing lung cancer screenings are right for you.  For informational purposes only. Not to replace the advice of your health care provider. Copyright   2023 [facility]  Services. All rights reserved. Clinically reviewed by the [facility] Transitions Program. [employer] [id number] - REV 01/24.  Learning About Stress  What is stress?     Stress is your body's response to a hard situation. Your body can have a physical, emotional, or mental response. Stress is a fact of life for most people, and it affects everyone differently. What causes stress for you may not be stressful for someone else.  A lot of things can cause stress. You may feel stress when you go on a job interview, take a test, or run a race. This kind of short-term stress is normal and even useful. It can help you if you need to work hard or react quickly. For example, stress can help you finish an important job on time.  Long-term stress is caused by ongoing stressful situations or events. Examples of long-term stress include long-term health problems, ongoing problems at work, or conflicts in your family. Long-term stress can harm your health.  How does stress affect your health?  When you are stressed, your body responds as though you are in danger. It makes hormones that speed up your heart, make you breathe faster, and give you a burst of energy. This is called the fight-or-flight stress response. If the stress is over quickly, your body goes back to normal and no harm is done.  But if stress happens too often or lasts too long, it can have bad effects. Long-term stress can make you more likely to get sick, and it can make symptoms of some diseases worse. If you tense up when you are stressed, you may develop neck, shoulder, or low back pain. Stress is linked to high blood pressure and heart disease.  Stress also harms your emotional health. It can make you carrero, tense, or depressed. Your relationships may suffer, and you may not do well at work or school.  What can you do to manage stress?  You can try these things to help manage stress:   Do something active. Exercise or activity can help reduce stress.  Walking is a great way to get started. Even everyday activities such as housecleaning or yard work can help.  Try yoga or елена chi. These techniques combine exercise and meditation. You may need some training at first to learn them.  Do something you enjoy. For example, listen to music or go to a movie. Practice your hobby or do volunteer work.  Meditate. This can help you relax, because you are not worrying about what happened before or what may happen in the future.  Do guided imagery. Imagine yourself in any setting that helps you feel calm. You can use online videos, books, or a teacher to guide you.  Do breathing exercises. For example:  From a standing position, bend forward from the waist with your knees slightly bent. Let your arms dangle close to the floor.  Breathe in slowly and deeply as you return to a standing position. Roll up slowly and lift your head last.  Hold your breath for just a few seconds in the standing position.  Breathe out slowly and bend forward from the waist.  Let your feelings out. Talk, laugh, cry, and express anger when you need to. Talking with supportive friends or family, a counselor, or a sin leader about your feelings is a healthy way to relieve stress. Avoid discussing your feelings with people who make you feel worse.  Write. It may help to write about things that are bothering you. This helps you find out how much stress you feel and what is causing it. When you know this, you can find better ways to cope.  What can you do to prevent stress?  You might try some of these things to help prevent stress:  Manage your time. This helps you find time to do the things you want and need to do.  Get enough sleep. Your body recovers from the stresses of the day while you are sleeping.  Get support. Your family, friends, and community can make a difference in how you experience stress.  Limit your news feed. Avoid or limit time on social media or news that may make you feel  "stressed.  Do something active. Exercise or activity can help reduce stress. Walking is a great way to get started.  Where can you learn more?  Go to https://www.[employer].net/patiented  Enter N032 in the search box to learn more about \"Learning About Stress.\"  Current as of: October 24, 2024  Content Version: 14.5    1399-9812 [employer].   Care instructions adapted under license by your healthcare professional. If you have questions about a medical condition or this instruction, always ask your healthcare professional. [employer] disclaims any warranty or liability for your use of this information.    Learning About Depression Screening  What is depression screening?  Depression screening is a way to see if you have depression symptoms. It may be done by a doctor or counselor. It's often part of a routine checkup. That's because your mental health is just as important as your physical health.  Depression is a mental health condition that affects how you feel, think, and act. You may:  Have less energy.  Lose interest in your daily activities.  Feel sad and grouchy for a long time.  Depression is very common. It affects people of all ages.  Many things can lead to depression. Some people become depressed after they have a stroke or find out they have a major illness like cancer or heart disease. The death of a loved one or a breakup may lead to depression. It can run in families. Most experts believe that a combination of inherited genes and stressful life events can cause it.  What happens during screening?  You may be asked to fill out a form about your depression symptoms. You and the doctor will discuss your answers. The doctor may ask you more questions to learn more about how you think, act, and feel.  What happens after screening?  If you have symptoms of depression, your doctor will talk to you about your options.  Doctors usually treat depression with medicines or counseling. " "Often, combining the two works best. Many people don't get help because they think that they'll get over the depression on their own. But people with depression may not get better unless they get treatment.  The cause of depression is not well understood. There may be many factors involved. But if you have depression, it's not your fault.  A serious symptom of depression is thinking about death or suicide. If you or someone you care about talks about this or about feeling hopeless, get help right away.  It's important to know that depression can be treated. Medicine, counseling, and self-care may help.  Where can you learn more?  Go to https://www.[employer].net/patiented  Enter T185 in the search box to learn more about \"Learning About Depression Screening.\"  Current as of: July 31, 2024  Content Version: 14.5    2255-9713 [employer].   Care instructions adapted under license by your healthcare professional. If you have questions about a medical condition or this instruction, always ask your healthcare professional. [employer] disclaims any warranty or liability for your use of this information.       "

## 2025-06-26 NOTE — PROGRESS NOTES
Preventive Care Visit  [facility]  Jun 26, 2025      Assessment & Plan       ICD-10-CM    1. Routine general medical examination at a health care facility  Z00.00       2. Encounter for IUD removal  Z30.432 REMOVE INTRAUTERINE DEVICE      3. Depression with anxiety  F41.8 Adult Mental Health  Referral      4. Attention deficit hyperactivity disorder (ADHD), combined type  F90.2 Adult Mental Health  Referral      5. Screening for thyroid disorder  Z13.29 TSH with free T4 reflex     TSH with free T4 reflex      6. Screening, anemia, deficiency, iron  Z13.0 Hemoglobin     Ferritin     Hemoglobin     Ferritin      7. Screening for diabetes mellitus  Z13.1 Comprehensive metabolic panel     Hemoglobin A1c     Comprehensive metabolic panel     Hemoglobin A1c           Patient has been advised of split billing requirements and indicates understanding: Yes        Depression Screening Follow Up        6/26/2025     8:46 AM   PHQ   PHQ-9 Total Score 10    Q9: Thoughts of better off dead/self-harm past 2 weeks Not at all       Patient-reported         6/26/2025     8:46 AM   Last PHQ-9   1.  Little interest or pleasure in doing things 1   2.  Feeling down, depressed, or hopeless 2   3.  Trouble falling or staying asleep, or sleeping too much 1   4.  Feeling tired or having little energy 2   5.  Poor appetite or overeating 0   6.  Feeling bad about yourself 3   7.  Trouble concentrating 1   8.  Moving slowly or restless 0   Q9: Thoughts of better off dead/self-harm past 2 weeks 0   PHQ-9 Total Score 10        Patient-reported         Follow Up Actions Taken  Crisis resource information provided in After Visit Summary  Mental Health Referral placed   Reviewed preventive health counseling, as reflected in patient instructions       Regular exercise       Healthy diet/nutrition  Counseling  Appropriate preventive services were addressed with this patient  via screening, questionnaire, or discussion as appropriate for fall prevention, nutrition, physical activity, Tobacco-use cessation, social engagement, weight loss and cognition.  Checklist reviewing preventive services available has been given to the patient.  Reviewed patient's diet, addressing concerns and/or questions.   She is at risk for lack of exercise and has been provided with information to increase physical activity for the benefit of her well-being.   She is at risk for psychosocial distress and has been provided with information to reduce risk.   The patient's PHQ-9 score is consistent with moderate depression. She was provided with information regarding depression.         Follow-up  Return in about 1 year (around 6/26/2026) for Routine Visit, or sooner with worsening symptoms.    Guillermo Davis is a 35 year old, presenting for the following:  Physical        6/26/2025     8:42 AM   Additional Questions   Roomed by Khang MI      Answers submitted by the patient for this visit:  Patient Health Questionnaire (Submitted on 6/26/2025)  If you checked off any problems, how difficult have these problems made it for you to do your work, take care of things at home, or get along with other people?: Somewhat difficult  PHQ9 TOTAL SCORE: 10      HPI  Patient ready to remove IUD with hopes to get pregnant soon       Advance Care Planning    Discussed advance care planning with patient; informed AVS has link to [facility] [employer].        6/26/2025   General Health   How would you rate your overall physical health? Good   Feel stress (tense, anxious, or unable to sleep) Very much   (!) STRESS CONCERN      6/26/2025   Nutrition   Three or more servings of calcium each day? Yes   Diet: Regular (no restrictions)   How many servings of fruit and vegetables per day? (!) 2-3   How many sweetened beverages each day? 0-1         6/26/2025   Exercise   Days per week of moderate/strenous exercise 3 days         6/26/2025    Social Factors   Frequency of gathering with friends or relatives Twice a week   Worry food won't last until get money to buy more No   Food not last or not have enough money for food? No   Do you have housing? (Housing is defined as stable permanent housing and does not include staying outside in a car, in a tent, in an abandoned building, in an overnight shelter, or couch-surfing.) Yes   Are you worried about losing your housing? No   Lack of transportation? No   Unable to get utilities (heat,electricity)? No         6/26/2025   Dental   Dentist two times every year? Yes       Today's PHQ-9 Score:       6/26/2025     8:46 AM   PHQ-9 SCORE   PHQ-9 Total Score MyChart 10 (Moderate depression)   PHQ-9 Total Score 10        Patient-reported         6/26/2025   Substance Use   Alcohol more than 3/day or more than 7/wk No   Do you use any other substances recreationally? No     Social History     Tobacco Use    Smoking status: Never    Smokeless tobacco: Never   Vaping Use    Vaping status: Never Used   Substance Use Topics    Alcohol use: Yes     Comment: 1-2 drinks on weekend    Drug use: Never          Mammogram Screening - Patient under 40 years of age: Routine Mammogram Screening not recommended.         6/26/2025   STI Screening   New sexual partner(s) since last STI/HIV test? No     History of abnormal Pap smear: No - age 30- 64 PAP with HPV every 5 years recommended             6/26/2025   Contraception/Family Planning   Questions about contraception or family planning (!) YES     What are your periods like? Regular        Reviewed and updated as needed this visit by Provider   Tobacco  Allergies  Meds  Problems  Med Hx  Surg Hx  Fam Hx            Past Medical History:   Diagnosis Date    Depressive disorder 12/2009    Take lexapro    Thyroid disease 12/2009    Hypothyroid - didnt take anything for it and it resolved     Past Surgical History:   Procedure Laterality Date    BIOPSY      Fatty tumor on  shoulder removed    ENT SURGERY      Tympanoplasty around age 6     Labs reviewed in EPIC  BP Readings from Last 3 Encounters:   06/26/25 107/74   08/01/24 119/82   03/26/24 115/80    Wt Readings from Last 3 Encounters:   06/26/25 68.4 kg (150 lb 12.8 oz)   08/01/24 65.3 kg (144 lb)   03/26/24 66.2 kg (146 lb)                  Patient Active Problem List   Diagnosis    Acne    ADD (attention deficit disorder)    Depression with anxiety    Hearing loss    Interstitial keratitis    Myopia    Prediabetes    Uses intrauterine device for birth control     Past Surgical History:   Procedure Laterality Date    BIOPSY      Fatty tumor on shoulder removed    ENT SURGERY      Tympanoplasty around age 6       Social History     Tobacco Use    Smoking status: Never    Smokeless tobacco: Never   Substance Use Topics    Alcohol use: Yes     Comment: 1-2 drinks on weekend     Family History   Problem Relation Age of Onset    Depression Mother     Anxiety Disorder Mother     Other Cancer Father         AML    Depression Father     Diabetes Other     Substance Abuse Brother          Current Outpatient Medications   Medication Sig Dispense Refill    amphetamine-dextroamphetamine (ADDERALL XR) 15 MG 24 hr capsule Take 15 mg by mouth daily (Patient taking differently: Take 5 mg by mouth daily.)      benzoyl peroxide (BENZOYL PEROXIDE WASH) 10 % external liquid Apply topically once a week      cholecalciferol 50 MCG (2000 UT) CAPS Take by mouth.      escitalopram (LEXAPRO) 10 MG tablet Take 5 mg by mouth daily      Magnesium Citrate 125 MG CAPS Take 250 mg by mouth daily      Misc Natural Products (PUMPKIN SEED OIL) CAPS Take 2,000 mg by mouth daily      Multiple Vitamins-Iron (MULTIVITAMIN/IRON PO) Take 1 capsule by mouth daily      omega 3 1000 MG CAPS Take by mouth.      paragard intrauterine copper device 1 each by Intrauterine route once      tretinoin (RETIN-A) 0.05 % external cream Apply topically at bedtime       Allergies  "  Allergen Reactions    Penicillins Hives and Rash     As a child   As a child   Welts, diarrhea    Welts, diarrhea    As a child    Amoxicillin-Pot Clavulanate     Sulfa Antibiotics Hives    Clarithromycin Rash and Unknown     Abstracted from transferred records - [facility])    Sulfamethoxazole-Trimethoprim Rash     Abstracted from transferred records - [facility])         Review of Systems  Constitutional, neuro, ENT, endocrine, pulmonary, cardiac, gastrointestinal, genitourinary, musculoskeletal, integument and psychiatric systems are negative, except as otherwise noted.     Objective    Exam  /74   Pulse 92   Temp 97.3  F (36.3  C) (Temporal)   Resp 16   Ht 1.707 m (5' 7.2\")   Wt 68.4 kg (150 lb 12.8 oz)   LMP 06/06/2025 (Exact Date)   SpO2 97%   BMI 23.48 kg/m     Estimated body mass index is 23.48 kg/m  as calculated from the following:    Height as of this encounter: 1.707 m (5' 7.2\").    Weight as of this encounter: 68.4 kg (150 lb 12.8 oz).    Physical Exam  GENERAL: alert and no distress  EYES: Eyes grossly normal to inspection, PERRL and conjunctivae and sclerae normal  HENT: ear canals and TM's normal, nose and mouth without ulcers or lesions  NECK: no adenopathy, no asymmetry, masses, or scars  RESP: lungs clear to auscultation - no rales, rhonchi or wheezes  CV: regular rate and rhythm, normal S1 S2, no S3 or S4, no murmur, click or rub, no peripheral edema  ABDOMEN: soft, nontender, no hepatosplenomegaly, no masses and bowel sounds normal   (female) w/bimanual: normal female external genitalia, normal urethral meatus, normal vaginal mucosa, and normal cervix/adnexa/uterus without masses or discharge; Paraguard IUD strings visualized and grasped with ring forceps' intact IUD removed with gentle pressure with no issues; patient tolerated procedure well; slight spotting noted at opening or cervix and side effects discussed with patient  MS: no gross musculoskeletal defects noted, no edema  SKIN: " no suspicious lesions or rashes  NEURO: Normal strength and tone, mentation intact and speech normal  PSYCH: mentation appears normal, affect normal/bright        Signed Electronically by: Jasiel Gupta PA-C

## 2025-06-27 ENCOUNTER — RESULTS FOLLOW-UP (OUTPATIENT)
Dept: FAMILY MEDICINE | Facility: CLINIC | Age: 35
End: 2025-06-27

## 2025-06-30 ENCOUNTER — PATIENT OUTREACH (OUTPATIENT)
Dept: CARE COORDINATION | Facility: CLINIC | Age: 35
End: 2025-06-30
Payer: COMMERCIAL

## 2025-07-20 ASSESSMENT — PATIENT HEALTH QUESTIONNAIRE - PHQ9
SUM OF ALL RESPONSES TO PHQ QUESTIONS 1-9: 10
10. IF YOU CHECKED OFF ANY PROBLEMS, HOW DIFFICULT HAVE THESE PROBLEMS MADE IT FOR YOU TO DO YOUR WORK, TAKE CARE OF THINGS AT HOME, OR GET ALONG WITH OTHER PEOPLE: VERY DIFFICULT
SUM OF ALL RESPONSES TO PHQ QUESTIONS 1-9: 10
10. IF YOU CHECKED OFF ANY PROBLEMS, HOW DIFFICULT HAVE THESE PROBLEMS MADE IT FOR YOU TO DO YOUR WORK, TAKE CARE OF THINGS AT HOME, OR GET ALONG WITH OTHER PEOPLE: VERY DIFFICULT
SUM OF ALL RESPONSES TO PHQ QUESTIONS 1-9: 10
SUM OF ALL RESPONSES TO PHQ QUESTIONS 1-9: 10

## 2025-07-21 ENCOUNTER — VIRTUAL VISIT (OUTPATIENT)
Dept: PSYCHIATRY | Facility: CLINIC | Age: 35
End: 2025-07-21
Attending: PHYSICIAN ASSISTANT
Payer: COMMERCIAL

## 2025-07-21 ENCOUNTER — MYC MEDICAL ADVICE (OUTPATIENT)
Dept: PSYCHIATRY | Facility: CLINIC | Age: 35
End: 2025-07-21
Payer: COMMERCIAL

## 2025-07-21 ENCOUNTER — VIRTUAL VISIT (OUTPATIENT)
Dept: BEHAVIORAL HEALTH | Facility: CLINIC | Age: 35
End: 2025-07-21
Payer: COMMERCIAL

## 2025-07-21 VITALS — WEIGHT: 149 LBS | BODY MASS INDEX: 23.39 KG/M2 | HEIGHT: 67 IN

## 2025-07-21 DIAGNOSIS — F33.1 MODERATE EPISODE OF RECURRENT MAJOR DEPRESSIVE DISORDER (H): Primary | ICD-10-CM

## 2025-07-21 DIAGNOSIS — F33.1 MODERATE EPISODE OF RECURRENT MAJOR DEPRESSIVE DISORDER (H): ICD-10-CM

## 2025-07-21 DIAGNOSIS — F41.9 ANXIETY: ICD-10-CM

## 2025-07-21 DIAGNOSIS — F90.0 ATTENTION DEFICIT HYPERACTIVITY DISORDER (ADHD), PREDOMINANTLY INATTENTIVE TYPE: Primary | ICD-10-CM

## 2025-07-21 DIAGNOSIS — F90.2 ATTENTION DEFICIT HYPERACTIVITY DISORDER (ADHD), COMBINED TYPE: Primary | ICD-10-CM

## 2025-07-21 PROCEDURE — 90791 PSYCH DIAGNOSTIC EVALUATION: CPT | Mod: 95 | Performed by: COUNSELOR

## 2025-07-21 PROCEDURE — 98002 SYNCH AUDIO-VIDEO NEW MOD 45: CPT | Performed by: PSYCHIATRY & NEUROLOGY

## 2025-07-21 RX ORDER — DEXTROAMPHETAMINE SACCHARATE, AMPHETAMINE ASPARTATE MONOHYDRATE, DEXTROAMPHETAMINE SULFATE AND AMPHETAMINE SULFATE 5; 5; 5; 5 MG/1; MG/1; MG/1; MG/1
20 CAPSULE, EXTENDED RELEASE ORAL DAILY
Qty: 30 CAPSULE | Refills: 0 | Status: SHIPPED | OUTPATIENT
Start: 2025-07-21 | End: 2025-08-20

## 2025-07-21 RX ORDER — LISDEXAMFETAMINE DIMESYLATE 30 MG/1
30 CAPSULE ORAL EVERY MORNING
Qty: 10 CAPSULE | Refills: 0 | Status: SHIPPED | OUTPATIENT
Start: 2025-08-20

## 2025-07-21 RX ORDER — ESCITALOPRAM OXALATE 10 MG/1
10 TABLET ORAL DAILY
Qty: 90 TABLET | Refills: 1 | Status: SHIPPED | OUTPATIENT
Start: 2025-07-21

## 2025-07-21 RX ORDER — DEXTROAMPHETAMINE SACCHARATE, AMPHETAMINE ASPARTATE, DEXTROAMPHETAMINE SULFATE AND AMPHETAMINE SULFATE 1.25; 1.25; 1.25; 1.25 MG/1; MG/1; MG/1; MG/1
5 TABLET ORAL DAILY PRN
COMMUNITY

## 2025-07-21 ASSESSMENT — PAIN SCALES - GENERAL: PAINLEVEL_OUTOF10: NO PAIN (0)

## 2025-07-21 NOTE — PATIENT INSTRUCTIONS
Treatment Plan:  Continue Lexapro/escitalopram 10 mg daily for anxiety, depression.  Increase Adderall XR to 20 mg daily as needed for ADHD.  At the end of your next 30-day prescription, we will trial Vyvanse 30 mg daily (10-day supply will be given).  There is a possibility you will need to message me to let me know if you want a refill of Vyvanse versus Adderall XR before your next appointment.  Just keep an eye on your supply and let me know if you need to.  Continue Adderall immediate release 5 mg daily as needed for ADHD.  Okay to continue this even with trial of Vyvanse.  See pregnancy/breast-feeding resources sent to [facility].  Continue therapy as planned.  Continue all other cares per primary care provider.   Continue all other medications as reviewed per electronic medical record today.   Safety plan reviewed. To the Emergency Department as needed or call after hours crisis line at [phone] or [phone]. [place] Crisis Text Line: Text [place] to 868215  or  Suicide LifeLine Chat: suicidepreventionlifeline.org/chat  Schedule an appointment with me in 6 weeks or sooner as needed.  Call [facility] at [phone] to schedule.  Follow up with primary care provider as planned or sooner if needed for acute medical concerns.  Call the psychiatric nurse line with medication questions or concerns at [phone].  [facility] may be used to communicate with your provider, but this is not intended to be used for emergencies.    Patient Education:  Risks of stimulant medication including, but not limited to, decreased appetite, risk of tics (and that they may be lasting), trouble sleeping, cardiac risks such as increased heart rate and blood pressure, and rare risk of sudden cardiac death.  Also risk of addiction/tolerance/dependence.    [facility]- Psychiatric Disorders During  Pregnancy  https://womensmentalhealth.org/specialty-clinics/psychiatric-disorders-during-pregnancy    [employer]Baby  [email or web address]    Lexapro:  https://[place].org/fact-sheets/citalopramescitalopram-celexalexapro-pregnancy/    Adderall:   https://mothertobaby.org/fact-sheets/dextroamphetamine-amphetamine-adderall/    Vyvanse:  See info on Adderall above     Sertraline:  https://[place].org/fact-sheets/sertraline-zoloft-pregnancy/    [facility] (Mother-Baby Programs):  [email or web address]    Risks and Benefits of use of medications during pregnancy and breastfeeding were reviewed.   Most common adverse effects of medications that were discussed include but are not limited to: fetal malformations, lung problems requiring support at birth associated with selective serotonin reuptake inhibitor use, low birth weight, and symptoms related to withdrawal of medication in infant (poor feeding, sleep disruptions, restlessness, etc).        Care team has reviewed attendance agreement with patient. Patient advised that two failed appointments within 6 months may lead to termination of current episode of care.     Community Resources:    National Suicide Prevention Lifeline: 464.146.8600 (TTY: 694.563.6771). Call anytime for help.  (www.suicidepreventionlifeline.org)  National [place] [employer] Mental Illness (www.lucinda.org): 422.257.6165 or 704-188-5841.   Mental Health Association (www.mentalhealth.org): 486.928.8120 or 552-380-9758.  [place] Crisis Text Line: Text [place] to 915726  Suicide LifeLine Chat: suicidepreventionLattice Incorporatedline.org/chat    Patient Education   Collaborative Care Psychiatry Service  What to Expect  Here's what to expect from your Collaborative Care Psychiatry Service (CCPS).   About CCPS  CCPS means 2 people work together to help you get better. You'll meet with a behavioral health clinician and a psychiatric doctor. A behavioral health clinician helps  "people with mental health problems by talking with them. A psychiatric doctor helps people by giving them medicine.  How it works  At every visit, you'll see the behavioral health clinician (BHC) first. They'll talk with you about how you're doing and teach you how to feel better.   Then you'll see the psychiatric doctor. This doctor can help you deal with troubling thoughts and feelings by giving you medicine. They'll make sure you know the plan for your care.   CCPS usually takes 3 to 6 visits. If you need more visits, we may have you start seeing a different psychiatric doctor for ongoing care.  If you have any questions or concerns, we'll be glad to talk with you.  About visits  Be open  At your visits, please talk openly about your problems. It may feel hard, but it's the best way for us to help you.  Cancelling visits  If you can't come to your visit, please call us right away at [phone]. If you don't cancel at least 24 hours (1 full day) before your visit, that's \"late cancellation.\"  Being late to visits  Being very late is the same as not showing up. You will be a \"no show\" if:  Your appointment starts with a BHC, and you're more than 15 minutes late for a 30-minute (half hour) visit. This will also cancel your appointment with the psychiatric doctor.  Your appointment is with a psychiatric doctor only, and you're more than 15 minutes late for a 30-minute (half hour) visit.  Your appointment is with a psychiatric doctor only, and you're more than 30 minutes late for a 60-minute (full hour) visit.  If you cancel late or don't show up 2 times within 6 months, we may end your care.   Getting help between visits  If you need help between visits, you can call us Monday to Friday from 8 a.m. to 4:30 p.m. at [phone].  Emergency care  Call 911 or go to the nearest emergency department if your life or someone else's life is in danger.  Call 988 anytime to reach the national Suicide and Crisis " hotline.  Medicine refills  To refill your medicine, call your pharmacy. You can also call [facility]'s Behavioral Access at [phone], Monday to Friday, 8 a.m. to 4:30 p.m. It can take 1 to 3 business days to get a refill.   Forms, letters, and tests  You may have papers to fill out, like FMLA, short-term disability, and workability. We can help you with these forms at your visits, but you must have an appointment. You may need more than 1 visit for this, to be in an intensive therapy program, or both.  Before we can give you medicine for ADHD, we may refer you to get tested for it or confirm it another way.  We may not be able to give you an emotional support animal letter.  We don't do mental health checks ordered by the court.   We don't do mental health testing, but we can refer you to get tested.   Thank you for choosing us for your care.  For informational purposes only. Not to replace the advice of your health care provider. Copyright   2022 [facility]. All rights reserved. [employer] 237885 - Rev 11/24.

## 2025-07-21 NOTE — PROGRESS NOTES
"Virtual Visit Details    Type of service:  Video Visit     Originating Location (pt. Location): {video visit patient location:202586::\"Home\"}  {PROVIDER LOCATION On-site should be selected for visits conducted from your clinic location or adjoining [facility] hospital, academic office, or other nearby [facility] building. Off-site should be selected for all other provider locations, including home:839600}  Distant Location (provider location):  {virtual location provider:385132}  Platform used for Video Visit: {Virtual Visit Platforms:576832::\"[employer]\"}  "

## 2025-07-21 NOTE — PROGRESS NOTES
"Virtual Visit Details    Type of service:  Video Visit     Originating Location (pt. Location): {video visit patient location:229453::\"Home\"}  {PROVIDER LOCATION On-site should be selected for visits conducted from your clinic location or adjoining [facility] hospital, academic office, or other nearby [facility] building. Off-site should be selected for all other provider locations, including home:355309}  Distant Location (provider location):  {virtual location provider:436648}  Platform used for Video Visit: {Virtual Visit Platforms:422022::\"[employer]\"}    "

## 2025-07-21 NOTE — NURSING NOTE
Current patient location: Unknown addres    Is the patient currently in the state of [place]? YES    Visit mode: VIDEO    If the visit is dropped, the patient can be reconnected by:VIDEO VISIT: Text to cell phone:   Telephone Information:   Mobile [phone]       Will anyone else be joining the visit? NO  (If patient encounters technical issues they should call [phone] :[phone])    Are changes needed to the allergy or medication list? No    Are refills needed on medications prescribed by this physician? NO    Rooming Documentation:  Questionnaire(s) completed Attendance guidelines (MH&A only)    Reason for visit: Consult    Luci DUMAS

## 2025-07-21 NOTE — PROGRESS NOTES
"    [facility] - [place]         PATIENT'S NAME: Susan Andrea  PREFERRED NAME: Susan  PRONOUNS:    MRN: [id number]  : 1990  ADDRESS: [id number] [place], [place] [address]T. NUMBER:  [id number]  DATE OF SERVICE: 25  START TIME: 835am  END TIME: 903am  PREFERRED PHONE: [phone]  May we leave a program related message: Yes  EMERGENCY CONTACT: was obtained Mathew Lara (Spouse)  [phone] (Mobile)  .  SERVICE MODALITY:  Video Visit:      Provider verified identity through the following two step process.  Patient provided:  Patient  and Patient address    Telemedicine Visit: The patient's condition can be safely assessed and treated via synchronous audio and visual telemedicine encounter.      Reason for Telemedicine Visit: Services only offered telehealth    Originating Site (Patient Location): Patient's home    Distant Site (Provider Location): Provider Remote Setting- Home Office    Consent:  The patient/guardian has verbally consented to: the potential risks and benefits of telemedicine (video visit) versus in person care; bill my insurance or make self-payment for services provided; and responsibility for payment of non-covered services.     Patient would like the video invitation sent by:  My Chart    Mode of Communication:  Video Conference via [facility]    Distant Location (Provider):  Off-site    As the provider I attest to compliance with applicable laws and regulations related to telemedicine.    UNIVERSAL ADULT Mental Health DIAGNOSTIC ASSESSMENT    Identifying Information:  Patient is a 35 year old,   individual.  Patient was referred for an assessment by primary care clinic.  Patient attended the session alone.    Chief Complaint:   The reason for seeking services at this time is: \"ADHD, medications and pregnancy\".  The problem(s) began 25.    Patient has attempted to resolve these concerns in the past through " outpatient care.    Social/Family History:  Patient reported they grew up in  [place].  They were raised by biological parents  .  Parents  /  when Pt was 10.  Pt has 2 siblings.  Patient reported that their childhood was fine, I lived mostly with my mom, I had on going contact with dad.  I was perhaps too enmeshed with mom during childhood.  Patient described their current relationships with family of origin as great relationship with dad, but not as close with my mom now.     The patient describes their cultural background as .  Cultural influences and impact on patient's life structure, values, norms, and healthcare: None.  Contextual influences on patient's health include: n/a.    These factors will be addressed in the Preliminary Treatment plan. Patient identified their preferred language to be English. Patient reported they does not need the assistance of an  or other support involved in therapy.     Patient reported had no significant delays in developmental tasks.   Patient's highest education level was graduate school  .  Patient identified the following learning problems: attention, concentration, and hearing.  Modifications will not be used to assist communication in therapy.  Patient reports they are  able to understand written materials.    Patient reported the following relationship history .  Patient's current relationship status is  since 2018.   Patient identified their sexual orientation as heterosexual.  Patient reported having 0  child(miguel angel). Patient identified partner; friends; therapist; spouse as part of their support system.  Patient identified the quality of these relationships as fair,  .      Patient's current living/housing situation involves staying in own home/apartment with partner.  The immediate members of family and household include Mathew Lara, Karolina,Spouse  and they report that housing is stable.    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  Patient reports their finances are obtained through employment. Patient does identify finances as a current stressor.      Patient reported that they have not been involved with the legal system.    . Patient does not report being under probation/ parole/ jurisdiction. .    Patient's Strengths and Limitations:  Patient identified the following strengths or resources that will help them succeed in treatment: friends / good social support, family support, insight, intelligence, positive work environment, and motivation. Things that may interfere with the patient's success in treatment include: none identified.     Assessments:  The following assessments were completed by patient for this visit:  PHQ2:   Phq2 (   1999 [employer],All Rights Reserved. Used With Permission. Developed By Katie Carballo Kurt Kroenke And Colleagues,With An Educational Sabas From [employer].)    6/26/2025  8:46 AM CDT - Filed by Patient   The following questionnaire should only be answered by the patient. Are you the patient? Yes   Over the last 2 weeks, how often have you been bothered by any of the following problems?    Q1: Little interest or pleasure in doing things Several days   Q2: Feeling down, depressed or hopeless More than half the days   PHQ2 (   1999 [employer],ALL RIGHTS RESERVED. USED WITH PERMISSION. DEVELOPED BY VINCENT OWENS,KVNG BENZ AND COLLEAGUES,WITH AN EDUCATIONAL SABAS FROM [employer].)    PHQ-2 Score (range: 0 - 6) 3   The following questionnaire should only be answered by the patient. Are you the patient? Yes   Over the last 2 weeks, how often have you been bothered by any of the following problems?    1. Little interest or pleasure in doing things  Several days    2.  Feeling down, depressed, or hopeless More than half the days   3.  Trouble falling or staying asleep, or sleeping too much Several days   4.   Feeling tired or having little energy More than half the days   5.  Poor appetite or overeating Not at all   6.  Feeling bad about yourself - or that you are a failure or have let yourself or your family down Nearly every day   7.  Trouble concentrating on things, such as reading the newspaper or watching television Several days   8.  Moving or speaking so slowly that other people could have noticed. Or the opposite - being so fidgety or restless that you have been moving around a lot more than usual Not at all   9.  Thoughts that you would be better off dead, or of hurting yourself in some way Not at all   If you checked off any problems, how difficult have these problems made it for you to do your work, take care of things at home, or get along with other people? Somewhat difficult   PHQ9 TOTAL SCORE (range: 0 - 27) 10 (Moderate depression)       PHQ9:       6/26/2025     8:46 AM 7/20/2025    10:48 PM   PHQ-9 SCORE   PHQ-9 Total Score MyChart 10 (Moderate depression) 10 (Moderate depression)   PHQ-9 Total Score 10  10        Patient-reported     GAD2:       7/20/2025    10:58 PM   CK-2   Feeling nervous, anxious, or on edge 2   Not being able to stop or control worrying 0   CK-2 Total Score 2        Patient-reported     GAD7:        No data to display              CAGE-AID:       7/20/2025    10:59 PM   CAGE-AID Total Score   Total Score 1    Total Score MyChart 1 (A total score of 2 or greater is considered clinically significant)       Patient-reported     PROMIS 10-Global Health (only subscores and total score):       7/20/2025    10:59 PM   PROMIS-10 Scores Only   Global Mental Health Score 9    Global Physical Health Score 15    PROMIS TOTAL - SUBSCORES 24        Patient-reported     [place] Suicide Severity Rating Scale (Lifetime/Recent)      7/21/2025     9:04 AM   [place] Suicide Severity Rating (Lifetime/Recent)   1. Wish to be Dead (Lifetime) Y   1. Wish to be Dead (Past 1 Month) N   2. Non-Specific  Active Suicidal Thoughts (Lifetime) N   Actual Attempt (Lifetime) N   Has subject engaged in non-suicidal self-injurious behavior? (Lifetime) N   Interrupted Attempts (Lifetime) N   Aborted or Self-Interrupted Attempt (Lifetime) N   Preparatory Acts or Behavior (Lifetime) N   Calculated C-SSRS Risk Score (Lifetime/Recent) No Risk Indicated       Personal and Family Medical History:  Patient does not report a family history of mental health concerns.  Patient reports family history includes Anxiety Disorder in her mother; Depression in her father and mother; Diabetes in an other family member; Other Cancer in her father; Substance Abuse in her brother..     Patient does report Mental Health Diagnosis and/or Treatment.  Patient reported the following previous diagnoses which include(s):  ADHD, anxiety, depression .  Patient reported symptoms began years ago.  Patient has received mental health services in the past:  therapy, psychiatry.  Psychiatric Hospitalizations: 0  Patient denies a history of civil commitment.      Currently, patient    is receiving other mental health services.  These include psychotherapy with Sarah Arana, telehealth out of [place] .   Nga Billings NP virtually [place]    Patient has had a physical exam to rule out medical causes for current symptoms.  Date of last physical exam was within the past year. Client was encouraged to follow up with PCP if symptoms were to develop. The patient has a [place] Primary Care Provider, who is named Aneta Gupta..  Patient reports no current medical concerns.  Patient denies any issues with pain..   There are not significant appetite / nutritional concerns / weight changes.   Patient does report a history of head injury / trauma / cognitive impairment.  Hx if mild concussion from a fall years ago    Patient reports current meds as:   Current Outpatient Medications   Medication Sig Dispense Refill    amphetamine-dextroamphetamine (ADDERALL XR) 15  MG 24 hr capsule Take 15 mg by mouth daily (Patient taking differently: Take 5 mg by mouth daily.)      benzoyl peroxide (BENZOYL PEROXIDE WASH) 10 % external liquid Apply topically once a week      cholecalciferol 50 MCG (2000 UT) CAPS Take by mouth.      escitalopram (LEXAPRO) 10 MG tablet Take 5 mg by mouth daily      Magnesium Citrate 125 MG CAPS Take 250 mg by mouth daily      Misc Natural Products (PUMPKIN SEED OIL) CAPS Take 2,000 mg by mouth daily      Multiple Vitamins-Iron (MULTIVITAMIN/IRON PO) Take 1 capsule by mouth daily      omega 3 1000 MG CAPS Take by mouth.      paragard intrauterine copper device 1 each by Intrauterine route once      tretinoin (RETIN-A) 0.05 % external cream Apply topically at bedtime       No current facility-administered medications for this visit.     Lexapro since 2017  Adderall since last Feb, started  and worked up to 20  Previous tried IR 10 BID    Current 5mg in morning  2.5 in afternoon    Medication Adherence:  Patient reports taking.  taking psychiatric medications as prescribed.    Patient Allergies:    Allergies   Allergen Reactions    Penicillins Hives and Rash     As a child   As a child   Welts, diarrhea    Welts, diarrhea    As a child    Amoxicillin-Pot Clavulanate     Sulfa Antibiotics Hives    Clarithromycin Rash and Unknown     Abstracted from transferred records - [facility])    Sulfamethoxazole-Trimethoprim Rash     Abstracted from transferred records - [facility])       Medical History:    Past Medical History:   Diagnosis Date    Depressive disorder 12/2009    Take lexapro    Thyroid disease 12/2009    Hypothyroid - didnt take anything for it and it resolved         Current Mental Status Exam:   Appearance:  Appropriate    Eye Contact:  Good   Psychomotor:  Normal       Gait / station:  not observed  Attitude / Demeanor: Cooperative   Speech      Rate / Production: Normal/ Responsive      Volume:  Normal  volume      Language:  intact  Mood:   Anxious    Affect:   Appropriate    Thought Content: Clear   Thought Process: Coherent  Goal Directed       Associations: No loosening of associations  Insight:   Good   Judgment:  Intact   Orientation:  Person Place Time Situation  Attention/concentration: Good    Substance Use:   Patient did report a family history of substance use concerns; see medical history section for details. Brother has adderall addiction.  Grandparents ETOH.  Patient has not received chemical dependency treatment in the past.  Patient has not ever been to detox.      Patient is not currently receiving any chemical dependency treatment.           Substance History of use Age of first use Date of last use     Pattern and duration of use (include amounts and frequency)   Alcohol currently use   21 07/19/25 6 drinks a week   Cannabis   never used     REPORTS SUBSTANCE USE: N/A     Amphetamines   never used     REPORTS SUBSTANCE USE: N/A   Cocaine/crack    never used       REPORTS SUBSTANCE USE: N/A   Hallucinogens never used         REPORTS SUBSTANCE USE: N/A   Inhalants never used         REPORTS SUBSTANCE USE: N/A   Heroin never used         REPORTS SUBSTANCE USE: N/A   Other Opiates never used     REPORTS SUBSTANCE USE: N/A   Benzodiazepine   never used     REPORTS SUBSTANCE USE: N/A   Barbiturates never used     REPORTS SUBSTANCE USE: N/A   Over the counter meds never used     REPORTS SUBSTANCE USE: N/A   Caffeine currently use 19   daily   Nicotine  never used     REPORTS SUBSTANCE USE: N/A   Other substances not listed above:  Identify:  never used     REPORTS SUBSTANCE USE: N/A     Patient reported the following problems as a result of their substance use: no problems, not applicable.  Patient reports obtaining substances from n/a.    Substance Use: No symptoms    Based on the CAGE score of 0 and clinical interview there  are not indications of drug or alcohol abuse.    Significant Losses / Trauma / Abuse / Neglect Issues:   Patient did not   serve in the .  There are not indications or report of significant loss, trauma, abuse or neglect issues related to: are no indications and client denies any losses, trauma, abuse, or neglect concerns.      Patient has not been a victim of exploitation.  Concerns for possible neglect are not present.     Safety Assessment:   Patient denies current or past homicidal ideation and behaviors.  Patient denies current/recent suicide ideation, plans, intent, or attempts but reports a history of brief situational instance in 2017 with passive remote suicidal ideation without intent or planning.  Denies previous attempts.   Patient denies current or past self-injurious behaviors.  Patient denied risk behaviors associated with substance use.  Patient denies any high risk behaviors associated with mental health symptoms.  Patient denied current or past personal safety concerns.    Patient denies past of current/recent assaultive behaviors.    Patient denied a history of sexual assault behaviors.     Patient reports there  are not,   firearms in the house.    Patient reports the following protective factors: hopeful, identifies reason for living, access to and engagement with healthcare, current engagement in treatment and/or motivation to establish therapeutic relationship, strong bond to family unit, community, job, school, etc, lives in a responsibly safe environment, and responsibilities to others forward or future oriented thinking; dedication to family or friends; safe and stable environment; regular sleep; purpose; daily obligations; structured day; positive social skills; healthy fear of risky behaviors or pain; access to a variety of clinical interventions and pets    Risk Plan:  See Recommendations for Safety and Risk Management Plan    Review of Symptoms per patient report:   Depression: Lack of interest or pleasure in doing things, Feeling sad, down, or depressed, Feelings of hopelessness, Change in energy  level, Low self-worth, Difficulties concentrating, Excessive or inappropriate guilt, Feelings of helplessness, Ruminations, Irritability, Withdrawn, and Frequent crying  Gina:  No Symptoms  Psychosis: No Symptoms  Anxiety: Excessive worry, Nervousness, Physical complaints, such as headaches, stomachaches, muscle tension, Sleep disturbance, Ruminations, Poor concentration, and Irritability  Panic:  No symptoms  Post Traumatic Stress Disorder:  No Symptoms   Eating Disorder: No Symptoms  ADD / ADHD:  Inattentive, Difficulties listening, Poor task completion, Poor organizational skills, Distractibility, Forgetful, and Impulsive 2022 no testing, after years  Conduct Disorder: No symptoms  Autism Spectrum Disorder: No symptoms  Obsessive Compulsive Disorder: No Symptoms   has OCD  Personality Disorders:  n/a    Current Stressors / Issues:  +hearing loss/aids  MH update:  ROS above  Stresses:  planning for preg,  has OCD, relationship with mom  Appetite:  hx of disordered eating  Sleep: falling asleep difficult   Outpatient Provider updates: psychotherapy with Sarah Arana, telehealth out of [place]   SI/SIB/HI: brief situational remote passive ideation back in 2017.  Denies previous attempts or self harm.  Denies current.  ROSELIA: social ETOH  Preg: planning  Side effects/compliance:  Interventions:  [facility] engaged in completion of DA with psychoeducation and tx planning  Most important: Meds    Patient reports the following compulsive behaviors and treatment history: None.      Diagnostic Criteria:   Major Depressive Disorder  CRITERIA (A-C) REPRESENT A MAJOR DEPRESSIVE EPISODE - SELECT THESE CRITERIA  A) Recurrent episode(s) - symptoms have been present during the same 2-week period and represent a change from previous functioning 5 or more symptoms (required for diagnosis)   - Depressed mood. Note: In children and adolescents, can be irritable mood.     - Diminished interest or pleasure in all, or almost all,  activities.    - Decreased sleep.    - Fatigue or loss of energy.    - Feelings of worthlessness or inappropriate and excessive guilt.    - Diminished ability to think or concentrate, or indecisiveness.   B) The symptoms cause clinically significant distress or impairment in social, occupational, or other important areas of functioning  C) The episode is not attributable to the physiological effects of a substance or to another medical condition  D) The occurence of major depressive episode is not better explained by other thought / psychotic disorders  E) There has never been a manic episode or hypomanic episode    Functional Status:  Patient reports the following functional impairments:  chronic disease management, health maintenance, management of the household and or completion of tasks, organization, relationship(s), self-care, social interactions, and work / vocational responsibilities.     Nonprogrammatic care:  Patient is requesting basic services to address current mental health concerns.    Clinical Summary:  1. Psychosocial Factors:  Occupational Issues, Interpersonal concerns.  Cultural and Contextual Factors: as above  2. Principal DSM5 Diagnoses  (Sustained by DSM5 Criteria Listed Above):   296.32 (F33.1) Major Depressive Disorder, Recurrent Episode, Moderate _ and With anxious distress.  3. Other Diagnoses that is relevant to services:   Attention-Deficit/Hyperactivity Disorder  314.00 (F90.0) Predominantly inattentive presentation  300.00 (F41.9) Unspecified Anxiety Disorder.  4. Provisional Diagnosis:  n/a  5. Prognosis: Relieve Acute Symptoms.  6. Likely consequences of symptoms if not treated: decompensation of MH.  7. Patient strengths include:  educated, empathetic, employed, goal-focused, good listener, has a previous history of therapy, insightful, intelligent, motivated, and open to learning .     Recommendations:     1. Plan for Safety and Risk Management:   Safety and Risk: Recommended that  patient call 911 or go to the local ED should there be a change in any of these risk factors        Report to child / adult protection services was NA.     2. Patient's identified mental health concerns with a cultural influence will be addressed by per Pt request.     3. Initial Treatment will focus on:    Depressed Mood - ,  Anxiety - ,  Attentional Problems - ,.     4. Resources/Service Plan:    services are not indicated.   Modifications to assist communication are not indicated.   Additional disability accommodations are not indicated.      5. Collaboration:   Collaboration / coordination of treatment will be initiated with the following  support professionals: psychiatry.      6.  Referrals:   The following referral(s) will be initiated: n/a.       A Release of Information has been obtained for the following: n/a.     Clinical Substantiation/medical necessity for the above recommendations:  Pt has a hx of depression, anxiety and ADHD symptoms that are impacting daily functioning in daily living and social settings. Through receiving support through CCPS model for medication and [facility] checking on use of coping skills and therapy to help combat these symptoms may provide Pt with relief. Pt reports that they are struggling to manage depressive and ADHD symptoms and again CCPS model can assist with providing coping skills, following up that pt is using these skills, safety plan or other interventions along with medication to have the best impact to manage symptoms and provide relief. At this time pt's symptoms are able to be managed with OP services and pt will be referred to a higher level of care if there are abrupt changes in presentation or risk of harm .    7. ROSELIA:    ROSELIA:  Discussed the general effects of drugs and alcohol on health and well-being. Provider gave patient printed information about the  effects of chemical use on their health and well being. Recommendations:  n/a .     8.  Records:   These were reviewed at time of assessment.   Information in this assessment was obtained from the medical record and  provided by patient who is a good historian.    Patient will have open access to their mental health medical record.    9.   Interactive Complexity: No    10. Safety Plan: No Safety plan indicated    Provider Name/ Credentials:  Kylah Rosario MA [facility]  July 21, 2025

## 2025-07-21 NOTE — PROGRESS NOTES
"Telemedicine Visit: The patient's condition can be safely assessed and treated via synchronous audio and visual telemedicine encounter.      Reason for Telemedicine Visit: Patient has requested telehealth visit    Originating Site (Patient Location): Patient's home    Distant Location (provider location):  Off-site    Consent:  The patient/guardian has verbally consented to: the potential risks and benefits of telemedicine (video visit) versus in person care; bill my insurance or make self-payment for services provided; and responsibility for payment of non-covered services.     Mode of Communication:  Video Conference via Metranome    As the provider I attest to compliance with applicable laws and regulations related to telemedicine.                                              Outpatient Psychiatric Evaluation- Standard  Adult    Name:  Susan Andrea  : 1990    Source of Referral:  Primary Care Provider: Jasiel Gupta PA-C   Current Psychotherapist: Nga Billings NP virtually [place]      Per primary care provider referral 2025:  My Clinical Question Is: med options       Identifying Data:  Patient is a 35 year old,  White Not  or  who presents for initial visit with me.  Patient is currently employed part time. Patient attended the phone/video session alone. Patient prefers to be called: \"Susan\"    Chief Complaint:  Patient presents with:  Consult      HPI:  Susan Andrea is a 35 year old with past history including depression, anxiety, ADHD who presents today for psychiatric assessment.     Patient with history of waxing and waning anxiety and depression symptoms over the years.  Diagnosed with ADHD as an adult by therapist whom she had been seeing for quite some time.  Patient started psychiatric medications in 2017.  Started with Lexapro.  Felt it was helpful for mood and anxiety symptoms.  Eventually had trial with Adderall after ADHD diagnosis.  Has had some dose " changes and is currently on very low-dose Adderall XR and IR.  ADHD and depression symptoms inadequately treated currently.  Patient considering trying to conceive with .   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  Patient is not thinking it will be a good fit.  No problematic drug or alcohol use.  No acute safety concerns.  No acute suicidality.  See [facility] note and review of symptoms below for additional details regarding current presentation.    Psych Meds at Intake:  -Adderall XR 5 mg in AM and IR 2.5 mg in afternoon - has been on up to 20 mg daily then dropped to 15 mg during supply issues   -Lexapro 10 mg daily-started around 2017, has found it helpful, well-tolerated, no side effects    Past Psych Meds:  None    Per [facility], during today's team-based visit:  MH update:  ROS   Stresses:  planning for preg,  has OCD, relationship with mom  Appetite:  hx of disordered eating  Sleep: falling asleep difficult   Outpatient Provider updates: psychotherapy with Sarah Arana, telehealth out of [place]   SI/SIB/HI: brief situational remote passive ideation back in 2017.  Denies previous attempts or self harm.  Denies current.  ROSELIA: social ETOH  Preg: planning  Side effects/compliance:  Interventions:  [facility] engaged in completion of DA with psychoeducation and tx planning  Most important: Meds     Patient reports the following compulsive behaviors and treatment history: None.      Past diagnoses include: Depression, anxiety, ADHD  Current medications include: has a current medication list which includes the following prescription(s): amphetamine-dextroamphetamine, benzoyl peroxide, cholecalciferol, escitalopram, magnesium citrate, pumpkin seed oil, multiple vitamins-iron, omega 3, paragard intrauterine copper, and tretinoin.   Medication side effects: Denies-except for worsening anxiety on Adderall immediate  release  Current stressors include: Symptoms and see HPI above  Coping mechanisms and supports include: Therapy, Family, Hobbies, and Friends    Psychiatric Review of Symptoms Per [facility] Kylah [facility], during today's team-based visit:  Depression:     Lack of interest or pleasure in doing things, Feeling sad, down, or depressed, Feelings of hopelessness, Change in energy level, Low self-worth, Difficulties concentrating, Excessive or inappropriate guilt, Feelings of helplessness, Ruminations, Irritability, Withdrawn, and Frequent crying  Gina:             No Symptoms  Psychosis:       No Symptoms  Anxiety:           Excessive worry, Nervousness, Physical complaints, such as headaches, stomachaches, muscle tension, Sleep disturbance, Ruminations, Poor concentration, and Irritability  Panic:              No symptoms  Post Traumatic Stress Disorder:  No Symptoms   Eating Disorder:          No Symptoms  ADD / ADHD:              Inattentive, Difficulties listening, Poor task completion, Poor organizational skills, Distractibility, Forgetful, and Impulsive 2022 no testing, after years  Conduct Disorder:       No symptoms  Autism Spectrum Disorder:     No symptoms  Obsessive Compulsive Disorder:       No Symptoms   has OCD  Personality Disorders:  n/a    All other ROS negative.     PHQ-9 scores:       6/26/2025     8:46 AM 7/20/2025    10:48 PM   PHQ-9 SCORE   PHQ-9 Total Score MyChart 10 (Moderate depression) 10 (Moderate depression)   PHQ-9 Total Score 10  10        Patient-reported       CK-7 scores:         No data to display                Medical Review of Systems:  10 systems (general, cardiovascular, respiratory, eyes, ENT, endocrine, GI, , M/S, neurological) were reviewed. Most pertinent finding(s) is/are: no fever, cough, persistent headaches, shortness of breath, chest pain, severe GI symptoms, trouble urinating, severe pain. The remaining systems are all unremarkable.    A 12-item WHODAS 2.0  assessment was not completed.    Psychiatric History:   Hospitalizations: None  History of Commitment? No   Past Treatment: counseling and medication(s) from physician / PCP  Suicide Attempts: No   Current Suicide Risk: Suicide Assessment Completed Today.  Self-injurious Behavior: Denies  Electroconvulsive Therapy (ECT) or Transcranial Magnetic Stimulation (TMS): No   GeneSight Genetic Testing: No     Past medication trials include but are not limited to:   Psych Meds at Intake:  -Adderall XR 5 mg in AM and IR 2.5 mg in afternoon - has been on up to 20 mg daily then dropped to 15 mg during supply issues   -Lexapro 10 mg daily-started around 2017, has found it helpful, well-tolerated, no side effects    Past Psych Meds:  None    Substance Use History:  Current Use of Drugs/Alcohol: Alcohol - up to 6 standard sized drinks a week; no cannabis or drugs  Past Use of Drugs/Alcohol: Denies history of problematic drug or alcohol use  Patient reports no problems as a result of their drinking / drug use.   Patient has not received chemical dependency treatment in the past  Recovery Programming Involvement: None    Tobacco use: No    Based on the clinical interview, there  are not indications of drug or alcohol abuse. Continue to monitor.   Discussed effect of substance use on overall health.     Past Medical History:  Past Medical History:   Diagnosis Date    Depressive disorder 12/2009    Take lexapro    Thyroid disease 12/2009    Hypothyroid - didnt take anything for it and it resolved      Surgery:   Past Surgical History:   Procedure Laterality Date    BIOPSY      Fatty tumor on shoulder removed    ENT SURGERY      Tympanoplasty around age 6     Food and Medicine Allergies:     Allergies   Allergen Reactions    Penicillins Hives and Rash     As a child   As a child   Welts, diarrhea    Welts, diarrhea    As a child    Amoxicillin-Pot Clavulanate     Sulfa Antibiotics Hives    Clarithromycin Rash and Unknown     Abstracted  from transferred records - [facility])    Sulfamethoxazole-Trimethoprim Rash     Abstracted from transferred records - [facility])     Seizures or Head Injury: Yes history of mild concussion from years ago; no known seizures  Diet: Not discussed  Exercise: Not discussed  Supplements: Reviewed per Electronic Medical Record Today    Current Medications:    Current Outpatient Medications:     amphetamine-dextroamphetamine (ADDERALL XR) 15 MG 24 hr capsule, Take 15 mg by mouth daily (Patient taking differently: Take 5 mg by mouth daily.), Disp: , Rfl:     benzoyl peroxide (BENZOYL PEROXIDE WASH) 10 % external liquid, Apply topically once a week, Disp: , Rfl:     cholecalciferol 50 MCG (2000 UT) CAPS, Take by mouth., Disp: , Rfl:     escitalopram (LEXAPRO) 10 MG tablet, Take 5 mg by mouth daily, Disp: , Rfl:     Magnesium Citrate 125 MG CAPS, Take 250 mg by mouth daily, Disp: , Rfl:     Misc Natural Products (PUMPKIN SEED OIL) CAPS, Take 2,000 mg by mouth daily, Disp: , Rfl:     Multiple Vitamins-Iron (MULTIVITAMIN/IRON PO), Take 1 capsule by mouth daily, Disp: , Rfl:     omega 3 1000 MG CAPS, Take by mouth., Disp: , Rfl:     paragard intrauterine copper device, 1 each by Intrauterine route once, Disp: , Rfl:     tretinoin (RETIN-A) 0.05 % external cream, Apply topically at bedtime, Disp: , Rfl:     The [place] Prescription Monitoring Program has been reviewed and there are no concerns about diversionary activity for controlled substances at this time.    07/10/2025 07/09/2025 1 Dextroamp-Amphet Er 5 Mg Cap 30.00 30 Na [place] [id number] Gra (6571) 0/0  Private Pay [place]   07/10/2025 07/09/2025 1 Dextroamp-Amphetamine 5 Mg Tab 15.00 30 Na [place] [id number] Gra (4373) 0/0  [place]   06/09/2025 05/28/2025 1 Dextroamp-Amphet Er 5 Mg Cap 30.00 30 Na [place] [id number] Gra (9215) 0/0  [place]   04/29/2025 04/28/2025 1 Dextroamp-Amphet Er 5 Mg Cap 30.00 30 Na [place] [id number] Gra (7074) 0/0  [place]       Vital Signs:  None since this is a  phone/video visit.     Labs:  Most recent laboratory results reviewed and the pertinent results include:   Office Visit on 06/26/2025   Component Date Value Ref Range Status    Hemoglobin 06/26/2025 13.9  11.7 - 15.7 g/dL Final    MCV 06/26/2025 86  78 - 100 fL Final    Sodium 06/26/2025 138  135 - 145 mmol/L Final    Potassium 06/26/2025 4.4  3.4 - 5.3 mmol/L Final    Carbon Dioxide (CO2) 06/26/2025 22  22 - 29 mmol/L Final    Anion Gap 06/26/2025 13  7 - 15 mmol/L Final    Urea Nitrogen 06/26/2025 13.7  6.0 - 20.0 mg/dL Final    Creatinine 06/26/2025 0.72  0.51 - 0.95 mg/dL Final    GFR Estimate 06/26/2025 >90  >60 mL/min/1.73m2 Final    eGFR calculated using 2021 CKD-EPI equation.    Calcium 06/26/2025 9.7  8.8 - 10.4 mg/dL Final    Chloride 06/26/2025 103  98 - 107 mmol/L Final    Glucose 06/26/2025 57 (L)  70 - 99 mg/dL Final    Alkaline Phosphatase 06/26/2025 49  40 - 150 U/L Final    AST 06/26/2025 23  0 - 45 U/L Final    ALT 06/26/2025 19  0 - 50 U/L Final    Protein Total 06/26/2025 7.5  6.4 - 8.3 g/dL Final    Albumin 06/26/2025 4.5  3.5 - 5.2 g/dL Final    Bilirubin Total 06/26/2025 0.3  <=1.2 mg/dL Final    TSH 06/26/2025 1.69  0.30 - 4.20 uIU/mL Final    Ferritin 06/26/2025 17  6 - 175 ng/mL Final    Estimated Average Glucose 06/26/2025 117 (H)  <117 mg/dL Final    Hemoglobin A1C 06/26/2025 5.7 (H)  0.0 - 5.6 % Final    Normal <5.7%   Prediabetes 5.7-6.4%    Diabetes 6.5% or higher     Note: Adopted from ADA consensus guidelines.     No EKG on file.     Family History:   Patient reported family history includes:   Family History   Problem Relation Age of Onset    Depression Mother     Anxiety Disorder Mother     Other Cancer Father         AML    Depression Father     Diabetes Other     Substance Abuse Brother      Mental Illness History: Yes: Per EPIC Electronic Medical Record  Substance Abuse History: Yes: Per EPIC Electronic Medical Record  Suicide History: Denies  Medications: Unknown     Social  History:    Social History                [per patient report]   Financial- What are your current financial sources?: (Patient-Rptd) employment, Does your finances cause stress?: (Patient-Rptd) does  Employment- What is your employment status?: (Patient-Rptd) employed part time, Able to function?: (Patient-Rptd) yes, If you work in a paying job or as a volunteer, describe the job and how long you have held it: : (Patient-Rptd) 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 Dayna worked there for about a year. Did you serve in the ?: (Patient-Rptd) did not  Living situation- What is your housing situation?: (Patient-Rptd) staying in own home/apartment  Feels safe at home- Yes  Household / family- Name: (Patient-Rptd) Mathew Lara, Age: (Patient-Rptd) 33, Relationship: (Patient-Rptd) Spouse, Living in same house?: (Patient-Rptd) yes  Relationships- What is your current relationship status? : (Patient-Rptd) , What is your sexual orientation?: (Patient-Rptd) heterosexual  Children- Do you have children?: (Patient-Rptd) no  Social/spiritual support- Who are the most supportive people in your life?  : (Patient-Rptd) partner;friends;therapist;spouse  Cultural- What is your cultural background? : (Patient-Rptd) , What are ethnic, cultural, or Alevism influences that may be useful to know about you (for example history of experiencing discrimination, growing up rural/urban, valuing culturally specific treatments)?  : (Patient-Rptd) None, What is your preferred language?  : (Patient-Rptd) English  Education- What is your highest education? : (Patient-Rptd) graduate school  Early history- Where did you grow up?: (Patient-Rptd) other, If other, please list below:: (Patient-Rptd) [place], Who took care of you as a child?: (Patient-Rptd) biological parents  Raised by- How would you describe your parent's relationships?: (Patient-Rptd)  / , How old were you when this  happened?: (Patient-Rptd) 10  Siblings- Do you have siblings?: (Patient-Rptd) yes, How many full siblings do you have?: (Patient-Rptd) 2  Quality of family relationships- How would you describe your current family relationships?: (Patient-Rptd) fair  Legal- Have you been involved with the legal system (child custody, order for protection, DWI, etc.)?: (Patient-Rptd) have not, Do you have a ?  : (Patient-Rptd) does not    Social History Per [facility] Kylah Rosario [facility], during today's team-based visit:  Patient reported they grew up in  [place].  They were raised by biological parents  .  Parents  /  when Pt was 10.  Pt has 2 siblings.  Patient reported that their childhood was fine, I lived mostly with my mom, I had on going contact with dad.  I was perhaps too enmeshed with mom during childhood.  Patient described their current relationships with family of origin as great relationship with dad, but not as close with my mom now.      The patient describes their cultural background as .  Cultural influences and impact on patient's life structure, values, norms, and healthcare: None.  Contextual influences on patient's health include: n/a.    These factors will be addressed in the Preliminary Treatment plan. Patient identified their preferred language to be English. Patient reported they does not need the assistance of an  or other support involved in therapy.      Patient reported had no significant delays in developmental tasks.   Patient's highest education level was graduate school  .  Patient identified the following learning problems: attention, concentration, and hearing.  Modifications will not be used to assist communication in therapy.  Patient reports they are  able to understand written materials.     Patient reported the following relationship history .  Patient's current relationship status is  since 2018.   Patient identified their sexual  orientation as heterosexual.  Patient reported having 0  child(miguel angel). Patient identified partner; friends; therapist; spouse as part of their support system.  Patient identified the quality of these relationships as fair,  .       Patient's current living/housing situation involves staying in own home/apartment with partner.  The immediate members of family and household include Mathew Lara, Karolina,Spouse  and they report that housing is stable.     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  Patient reports their finances are obtained through employment. Patient does identify finances as a current stressor.      Firearms/Weapons Access: No: Patient denies   Service: No    Legal History:  No: Patient denies any legal history    Significant Losses / Trauma / Abuse / Neglect Issues:  There are no indications or report of: significant losses, trauma, abuse or neglect.   Issues of possible neglect are not present.     Comprehensive Examination (limited due to virtual visit format, phone/video):  Vital Signs:  Vitals: There were no vitals taken for this visit.  General/Constitutional:  Appearance: awake, alert, adequately groomed, appeared stated age and no apparent distress  Attitude:  cooperative, pleasant  Eye Contact:  good  Musculoskeletal:  Muscle Strength and Tone: no gross abnormalities by observation  Psychomotor Behavior:  no evidence of tardive dyskinesia, dystonia, or tics  Gait and Station: normal, no gross abnormalities noted by observation  Psychiatric:  Speech:  clear, coherent, regular rate, rhythm, and volume  Associations:  no loose associations  Thought Process:  logical, linear and goal oriented  Thought Content:  no evidence of suicidal ideation or homicidal ideation, no evidence of psychotic thought, no auditory hallucinations present and no visual hallucinations present  Mood:  A little down, stressed, scattered  Affect:  mood congruent, little tearful at  times  Insight:  good  Judgment:  intact, adequate for safety  Impulse Control:  intact  Neurological:  Oriented to:  person, place, time, and situation  Attention Span and Concentration:  normal  Language: intact  Recent and Remote Memory:  Intact to interview. Not formally assessed.   Fund of Knowledge: appropriate    Strengths and Opportunities Per [facility] Kylah Rosario [facility], during today's team-based visit:  Patient identified the following strengths or resources that will help them succeed in treatment: friends / good social support, family support, insight, intelligence, positive work environment, and motivation. Things that may interfere with the patient's success in treatment include: none identified.     Suicide Risk Assessment:  Today Susan Andrea reports no suicidal ideation. Based on all available evidence including the factors cited above, Susan Andrea does not appear to be at imminent risk for self-harm, does not meet criteria for a 72-hr hold, and therefore remains appropriate for ongoing outpatient level of care.  A thorough assessment of risk factors related to suicide and self-harm have been reviewed and are noted above. The patient convincingly denies acute suicidality on several occasions. Local community safety resources were provided for patient to use if needed. There was no deceit detected, and the patient presented in a manner that was believable.     DSM5  Diagnosis:  Attention-Deficit/Hyperactivity Disorder  314.01 (F90.9) Unspecified Attention -Deficit / Hyperactivity Disorder  296.32 (F33.1) Major Depressive Disorder, Recurrent Episode, Moderate _  Anxiety, unspecified    Medical Comorbidities Include:   Patient Active Problem List    Diagnosis Date Noted    Prediabetes 02/16/2023     Priority: Medium    Depression with anxiety 02/13/2023     Priority: Medium    Uses intrauterine device for birth control 04/26/2016     Priority: Medium     Formatting of this note might be different  from the original.   Formatting of this note might be different from the original.    04/26/2016: Paragard placed in [place], [place], unsure when.  Formatting of this note might be different from the original.    04/26/2016: Paragard placed in [place], [place], unsure when.      Interstitial keratitis 05/27/2015     Priority: Medium    Myopia 05/27/2015     Priority: Medium    ADD (attention deficit disorder) 10/19/2012     Priority: Medium    Acne 02/06/2012     Priority: Medium    Hearing loss 03/21/1994     Priority: Medium     Formatting of this note might be different from the original.   Formatting of this note might be different from the original.    04/26/2016: Since age 4. Has B/L hearing aids. Has had mulitple surgeries to try and fix perforations of TM.  Formatting of this note might be different from the original.    04/26/2016: Since age 4. Has B/L hearing aids. Has had mulitple surgeries to try and fix perforations of TM.         Impression:  Susan Andrea is a 35-year-old with past psychiatric history including depression, anxiety, ADHD who presents today for psychiatric evaluation.  Patient with history of waxing and waning depression and anxiety symptoms with later diagnosis of ADHD by therapist patient had been seeing for quite some time.  Currently taking Lexapro and Adderall.  Started medication in 2017-started with Lexapro.  Has found both medications very helpful.  ADHD currently seems undertreated.  Patient had been on Adderall XR 20 mg daily with good efficacy and notable benefits.  Dose of Adderall has been reduced since patient trying to conceive soon.  Patient really struggling at work, at home, and in her relationship on current doses of medications.  Patient agreeable to increasing Adderall back to 20 mg extended release.  Could consider increasing Lexapro further as well if indicated.  Discussed risks versus benefits of Lexapro and Adderall use during pregnancy and breast-feeding.  Sending  information on sertraline as well in the case patient interested in switching.  Patient had previously been interested in Vyvanse but due to the shortage and cost of the medication she stayed on Adderall XR.  Small trial of Vyvanse sent to be started at the end of this next Adderall XR prescription.  No acute safety concerns.  No acute suicidality.  No problematic drug or alcohol use.  Tolerating stimulant well with no notable cardiac symptoms.  Patient will be seen back in 6 weeks    Medication side effects and alternatives reviewed. Health promotion activities recommended and reviewed today. All questions addressed. Education and counseling completed regarding risks and benefits of medications and psychotherapy options. Recommend therapy for additional support.     Treatment Plan:  Continue Lexapro/escitalopram 10 mg daily for anxiety, depression.  Increase Adderall XR to 20 mg daily as needed for ADHD.  At the end of your next 30-day prescription, we will trial Vyvanse 30 mg daily (10-day supply will be given).  There is a possibility you will need to message me to let me know if you want a refill of Vyvanse versus Adderall XR before your next appointment.  Just keep an eye on your supply and let me know if you need to.  Continue Adderall immediate release 5 mg daily as needed for ADHD.  Okay to continue this even with trial of Vyvanse.  See pregnancy/breast-feeding resources sent to [facility].  Continue therapy as planned.  Continue all other cares per primary care provider.   Continue all other medications as reviewed per electronic medical record today.   Safety plan reviewed. To the Emergency Department as needed or call after hours crisis line at [phone] or [phone]. [place] Crisis Text Line: Text [place] to 050925  or  Suicide LifeLine Chat: suicidepreventionlifeline.org/chat  Schedule an appointment with me in 6 weeks or sooner as needed.  Call [facility] at [phone] to  schedule.  Follow up with primary care provider as planned or sooner if needed for acute medical concerns.  Call the psychiatric nurse line with medication questions or concerns at [phone].  [facility] may be used to communicate with your provider, but this is not intended to be used for emergencies.    Patient Education:  Risks of stimulant medication including, but not limited to, decreased appetite, risk of tics (and that they may be lasting), trouble sleeping, cardiac risks such as increased heart rate and blood pressure, and rare risk of sudden cardiac death.  Also risk of addiction/tolerance/dependence.    [facility]- Psychiatric Disorders During Pregnancy  [email or web address]    Mother[facility]  [email or web address]    Lexapro:  https://[email or web address]/fact-sheets/citalopramescitalopram-celexalexapro-pregnancy/    Adderall:   [email or web address]://[email or web address]/fact-sheets/dextroamphetamine-amphetamine-adderall/    Vyvanse:  See info on Adderall above     Sertraline:  https://[email or web address]/fact-sheets/sertraline-zoloft-pregnancy/    [facility] (Mother-Baby Programs):  [email or web address]    Risks and Benefits of use of medications during pregnancy and breastfeeding were reviewed.   Most common adverse effects of medications that were discussed include but are not limited to: fetal malformations, lung problems requiring support at birth associated with selective serotonin reuptake inhibitor use, low birth weight, and symptoms related to withdrawal of medication in infant (poor feeding, sleep disruptions, restlessness, etc).        Care team has reviewed attendance agreement with patient. Patient advised that two failed appointments within 6 months may lead to termination of current episode of care.     Community Resources:    National Suicide Prevention Lifeline: 185.168.9877  (TTY: 769.377.1263). Call anytime for help.  (www.suicidepreventionlifeline.org)  National [place] [employer]ness (www.[place].org): 327.468.3726 or 473-976-2541.   Mental Health Association (www.mentalhealth.org): 419.285.1790 or 447-338-2416.  [place] Crisis Text Line: Text [place] to 670950  Suicide LifeLine Chat: [facility].org/chat    Administrative Billing:   Phone Call/Video Duration: 39 Minutes  Start: 9:17a  Stop: 9:56a    The longitudinal plan of care for the diagnosis(es)/condition(s) as documented were addressed during this visit. Due to the added complexity in care, I will continue to support Susan in the subsequent management and with ongoing continuity of care.    Episode of Care #: 1    Patient Status:  Patient will continue to be seen for ongoing consultation and stabilization.    Signed:   Sarah Sosa DO  [facility]    Disclaimer: This note consists of symbols derived from keyboarding, dictation and/or voice recognition software. As a result, there may be errors in the script that have gone undetected. Please consider this when interpreting information found in this chart.

## 2025-08-25 ENCOUNTER — MYC MEDICAL ADVICE (OUTPATIENT)
Dept: FAMILY MEDICINE | Facility: CLINIC | Age: 35
End: 2025-08-25
Payer: COMMERCIAL